# Patient Record
Sex: FEMALE | Race: WHITE | NOT HISPANIC OR LATINO | Employment: FULL TIME | URBAN - METROPOLITAN AREA
[De-identification: names, ages, dates, MRNs, and addresses within clinical notes are randomized per-mention and may not be internally consistent; named-entity substitution may affect disease eponyms.]

---

## 2023-07-20 ENCOUNTER — LAB (OUTPATIENT)
Dept: LAB | Facility: HOSPITAL | Age: 28
End: 2023-07-20
Payer: COMMERCIAL

## 2023-07-20 PROBLEM — O46.8X1 SUBCHORIONIC HEMORRHAGE OF PLACENTA IN FIRST TRIMESTER, SINGLE OR UNSPECIFIED FETUS: Status: ACTIVE | Noted: 2023-07-20

## 2023-07-20 PROBLEM — O41.8X10 SUBCHORIONIC HEMORRHAGE OF PLACENTA IN FIRST TRIMESTER, SINGLE OR UNSPECIFIED FETUS: Status: ACTIVE | Noted: 2023-07-20

## 2023-07-20 PROBLEM — IMO0001 SUBCHORIONIC HEMORRHAGE OF PLACENTA IN FIRST TRIMESTER, SINGLE OR UNSPECIFIED FETUS: Status: ACTIVE | Noted: 2023-07-20

## 2023-07-20 LAB — HEMOGLOBIN FRACTIONATION: NORMAL

## 2023-07-20 PROCEDURE — 83020 HEMOGLOBIN ELECTROPHORESIS: CPT | Performed by: OBSTETRICS & GYNECOLOGY

## 2023-07-20 PROCEDURE — 84443 ASSAY THYROID STIM HORMONE: CPT | Performed by: OBSTETRICS & GYNECOLOGY

## 2023-08-04 ENCOUNTER — LAB (OUTPATIENT)
Dept: LAB | Facility: HOSPITAL | Age: 28
End: 2023-08-04
Payer: COMMERCIAL

## 2023-08-04 ENCOUNTER — ROUTINE PRENATAL (OUTPATIENT)
Dept: OBSTETRICS AND GYNECOLOGY | Age: 28
End: 2023-08-04
Payer: COMMERCIAL

## 2023-08-04 VITALS — BODY MASS INDEX: 21.82 KG/M2 | SYSTOLIC BLOOD PRESSURE: 104 MMHG | WEIGHT: 123.2 LBS | DIASTOLIC BLOOD PRESSURE: 60 MMHG

## 2023-08-04 DIAGNOSIS — O41.8X10 SUBCHORIONIC HEMORRHAGE OF PLACENTA IN FIRST TRIMESTER, SINGLE OR UNSPECIFIED FETUS: Primary | ICD-10-CM

## 2023-08-04 DIAGNOSIS — O46.8X1 SUBCHORIONIC HEMORRHAGE OF PLACENTA IN FIRST TRIMESTER, SINGLE OR UNSPECIFIED FETUS: Primary | ICD-10-CM

## 2023-08-04 DIAGNOSIS — Z3A.10 10 WEEKS GESTATION OF PREGNANCY: ICD-10-CM

## 2023-08-04 LAB
EXTERNAL CYSTIC FIBROSIS: NEGATIVE
EXTERNAL NIPT: NORMAL
GLUCOSE UR STRIP-MCNC: NEGATIVE MG/DL
PROT UR STRIP-MCNC: NEGATIVE MG/DL
VZV IGG SER QL: NORMAL

## 2023-08-04 RX ORDER — CHOLECALCIFEROL (VITAMIN D3) 25 MCG
1 TABLET,CHEWABLE ORAL DAILY
Qty: 60 CAPSULE | Refills: 11 | Status: SHIPPED | OUTPATIENT
Start: 2023-08-04

## 2023-08-11 ENCOUNTER — ROUTINE PRENATAL (OUTPATIENT)
Dept: OBSTETRICS AND GYNECOLOGY | Age: 28
End: 2023-08-11
Payer: COMMERCIAL

## 2023-08-11 VITALS — BODY MASS INDEX: 21.97 KG/M2 | WEIGHT: 124 LBS | SYSTOLIC BLOOD PRESSURE: 102 MMHG | DIASTOLIC BLOOD PRESSURE: 64 MMHG

## 2023-08-11 DIAGNOSIS — O41.8X10 SUBCHORIONIC HEMORRHAGE OF PLACENTA IN FIRST TRIMESTER, SINGLE OR UNSPECIFIED FETUS: ICD-10-CM

## 2023-08-11 DIAGNOSIS — O46.8X1 SUBCHORIONIC HEMORRHAGE OF PLACENTA IN FIRST TRIMESTER, SINGLE OR UNSPECIFIED FETUS: ICD-10-CM

## 2023-08-11 DIAGNOSIS — R39.15 URINARY URGENCY: ICD-10-CM

## 2023-08-11 DIAGNOSIS — Z3A.11 11 WEEKS GESTATION OF PREGNANCY: Primary | ICD-10-CM

## 2023-08-11 LAB
BILIRUB BLD-MCNC: ABNORMAL MG/DL
CLARITY, POC: CLEAR
COLOR UR: YELLOW
GLUCOSE UR STRIP-MCNC: NEGATIVE MG/DL
KETONES UR QL: ABNORMAL
LEUKOCYTE EST, POC: ABNORMAL
NITRITE UR-MCNC: NEGATIVE MG/ML
PH UR: 5.5 [PH] (ref 5–8)
PROT UR STRIP-MCNC: ABNORMAL MG/DL
RBC # UR STRIP: NEGATIVE /UL
SP GR UR: 1.03 (ref 1–1.03)
UROBILINOGEN UR QL: ABNORMAL

## 2023-08-11 RX ORDER — NITROFURANTOIN 25; 75 MG/1; MG/1
100 CAPSULE ORAL 2 TIMES DAILY
Qty: 14 CAPSULE | Refills: 0 | Status: SHIPPED | OUTPATIENT
Start: 2023-08-11 | End: 2023-08-18

## 2023-08-11 NOTE — PROGRESS NOTES
Chief Complaint   Patient presents with    Routine Prenatal Visit     Urgency a few times, unable to void       HPI: 27 y.o.  at 11w5d     Doing well  Add on OB for urinary urgency and difficulty with emptying bladder x 1   Denies flank pain, fever or chills  She denies dysuria  Denies LOF or bleeding  Pt of Dr. William Stanford:    23 1312   BP: 102/64   Weight: 56.2 kg (124 lb)       ROS:  GI:  Negative  : Urgency   Pulmonary: Negative     A/P  1. Intrauterine pregnancy at 11w5d   2. Pregnancy Risk:  NORMAL    Diagnoses and all orders for this visit:    1. 11 weeks gestation of pregnancy (Primary)  -     POC Urinalysis Dipstick    2. Subchorionic hemorrhage of placenta in first trimester, single or unspecified fetus    3. Urinary urgency  -     Urine Culture - Urine, Urine, Clean Catch    Other orders  -     nitrofurantoin, macrocrystal-monohydrate, (MACROBID) 100 MG capsule; Take 1 capsule by mouth 2 (Two) Times a Day for 7 days.  Dispense: 14 capsule; Refill: 0        -----------------------  PLAN:   Urine culture sent  Will treat for UTI  ER warnings discussed  Keep scheduled f/u       DAVY Diop  2023 13:29 EDT

## 2023-08-13 LAB
BACTERIA UR CULT: NO GROWTH
BACTERIA UR CULT: NORMAL

## 2023-08-14 ENCOUNTER — TELEPHONE (OUTPATIENT)
Facility: HOSPITAL | Age: 28
End: 2023-08-14
Payer: COMMERCIAL

## 2023-08-16 LAB
Lab: NEGATIVE
Lab: NORMAL
NTRA ALPHA-THALASSEMIA: NEGATIVE
NTRA BETA-HEMOGLOBINOPATHIES: NEGATIVE
NTRA CANAVAN DISEASE: NEGATIVE
NTRA CYSTIC FIBROSIS: NEGATIVE
NTRA DUCHENNE/BECKER MUSCULAR DYSTROPHY: NEGATIVE
NTRA FAMILIAL DYSAUTONOMIA: NEGATIVE
NTRA FRAGILE X SYNDROME: NEGATIVE
NTRA GALACTOSEMIA: NEGATIVE
NTRA GAUCHER DISEASE: NEGATIVE
NTRA MEDIUM CHAIN ACYL-COA DEHYDROGENASE DEFICIENCY: NEGATIVE
NTRA POLYCYSTIC KIDNEY DISEASE, AUTOSOMAL RECESSIVE: NEGATIVE
NTRA SMITH-LEMLI-OPITZ SYNDROME: NEGATIVE
NTRA SPINAL MUSCULAR ATROPHY: NEGATIVE
NTRA TAY-SACHS DISEASE: NEGATIVE

## 2023-09-05 ENCOUNTER — ROUTINE PRENATAL (OUTPATIENT)
Dept: OBSTETRICS AND GYNECOLOGY | Age: 28
End: 2023-09-05
Payer: COMMERCIAL

## 2023-09-05 VITALS — SYSTOLIC BLOOD PRESSURE: 104 MMHG | WEIGHT: 127 LBS | DIASTOLIC BLOOD PRESSURE: 66 MMHG | BODY MASS INDEX: 22.5 KG/M2

## 2023-09-05 DIAGNOSIS — Z3A.15 15 WEEKS GESTATION OF PREGNANCY: Primary | ICD-10-CM

## 2023-09-05 LAB
2ND TRIMESTER 4 SCREEN SERPL-IMP: NORMAL
AFP INTERP SERPL-IMP: NORMAL
GLUCOSE UR STRIP-MCNC: NEGATIVE MG/DL
PROT UR STRIP-MCNC: NEGATIVE MG/DL

## 2023-09-05 NOTE — PROGRESS NOTES
Pt presents for routine visit. She denies any vag bleeding or significant pain. She has occ ligament pain and some GERD    A/p: 15 weeks: pt declines afp screening, u/s at f/u. Reviewed management of GERD and ligament pain. F/u 4 weeks with anatomy u/s.

## 2023-10-09 ENCOUNTER — ROUTINE PRENATAL (OUTPATIENT)
Dept: OBSTETRICS AND GYNECOLOGY | Age: 28
End: 2023-10-09
Payer: COMMERCIAL

## 2023-10-09 VITALS — BODY MASS INDEX: 23.24 KG/M2 | WEIGHT: 131.2 LBS | SYSTOLIC BLOOD PRESSURE: 100 MMHG | DIASTOLIC BLOOD PRESSURE: 62 MMHG

## 2023-10-09 DIAGNOSIS — Z3A.20 20 WEEKS GESTATION OF PREGNANCY: Primary | ICD-10-CM

## 2023-10-09 LAB
GLUCOSE UR STRIP-MCNC: NEGATIVE MG/DL
PROT UR STRIP-MCNC: NEGATIVE MG/DL

## 2023-10-09 RX ORDER — CETIRIZINE HYDROCHLORIDE 10 MG/1
TABLET, CHEWABLE ORAL EVERY 24 HOURS
COMMUNITY
Start: 2023-09-23 | End: 2023-10-23

## 2023-10-09 NOTE — PROGRESS NOTES
Pt presents for routine visit and u/s for anatomy    U/s: normal anatomic survey; cervical length 5.0 cm    A/p: 20 weeks: reviewed complete anatomic survey and reassuring cervical length. Pt plans to get flu shot at work    Hx ANIA: growth u/s, at 28 weeks

## 2023-11-07 ENCOUNTER — ROUTINE PRENATAL (OUTPATIENT)
Dept: OBSTETRICS AND GYNECOLOGY | Age: 28
End: 2023-11-07
Payer: COMMERCIAL

## 2023-11-07 VITALS — DIASTOLIC BLOOD PRESSURE: 60 MMHG | SYSTOLIC BLOOD PRESSURE: 118 MMHG | WEIGHT: 135.8 LBS | BODY MASS INDEX: 24.06 KG/M2

## 2023-11-07 DIAGNOSIS — Z3A.24 24 WEEKS GESTATION OF PREGNANCY: Primary | ICD-10-CM

## 2023-11-07 LAB
GLUCOSE UR STRIP-MCNC: NEGATIVE MG/DL
PROT UR STRIP-MCNC: NEGATIVE MG/DL

## 2023-11-07 NOTE — PROGRESS NOTES
Pt presents for routine visit. She denies vag bleeding/leaking fluid or reg ctx. She does note a tight feeling in her vagina.     A/p: vaginal tightness/possible pressure: reviewed PTL warnings. Will check cervical length u/s today    24 weeks: pt did not get flu shot at work and declines. F/u 4 weeks with one hour glucose    Hx ANIA: growth u/s at f/u.

## 2023-11-22 ENCOUNTER — HOSPITAL ENCOUNTER (EMERGENCY)
Facility: HOSPITAL | Age: 28
Discharge: HOME | End: 2023-11-22
Attending: OBSTETRICS & GYNECOLOGY | Admitting: OBSTETRICS & GYNECOLOGY
Payer: COMMERCIAL

## 2023-11-22 VITALS
RESPIRATION RATE: 16 BRPM | BODY MASS INDEX: 24.77 KG/M2 | SYSTOLIC BLOOD PRESSURE: 110 MMHG | WEIGHT: 139.8 LBS | TEMPERATURE: 98.7 F | HEIGHT: 63 IN | DIASTOLIC BLOOD PRESSURE: 70 MMHG | HEART RATE: 86 BPM

## 2023-11-22 LAB
BACTERIA UR QL AUTO: ABNORMAL /HPF
BILIRUB UR QL STRIP: NEGATIVE
CLARITY UR: CLEAR
COD CRY URNS QL: ABNORMAL /HPF
COLOR UR: YELLOW
FIBRONECTIN FETAL VAG QL: NEGATIVE
GLUCOSE UR STRIP-MCNC: NEGATIVE MG/DL
HGB UR QL STRIP.AUTO: NEGATIVE
HYALINE CASTS UR QL AUTO: ABNORMAL /LPF
KETONES UR QL STRIP: ABNORMAL
LEUKOCYTE ESTERASE UR QL STRIP.AUTO: ABNORMAL
NITRITE UR QL STRIP: NEGATIVE
PH UR STRIP.AUTO: 8 [PH] (ref 5–8)
PROT UR QL STRIP: NEGATIVE
RBC # UR STRIP: ABNORMAL /HPF
REF LAB TEST METHOD: ABNORMAL
SP GR UR STRIP: <1.005 (ref 1–1.03)
SQUAMOUS #/AREA URNS HPF: ABNORMAL /HPF
UROBILINOGEN UR QL STRIP: ABNORMAL
WBC # UR STRIP: ABNORMAL /HPF

## 2023-11-22 PROCEDURE — 81001 URINALYSIS AUTO W/SCOPE: CPT | Performed by: OBSTETRICS & GYNECOLOGY

## 2023-11-22 PROCEDURE — 82731 ASSAY OF FETAL FIBRONECTIN: CPT | Performed by: OBSTETRICS & GYNECOLOGY

## 2023-11-22 PROCEDURE — 87086 URINE CULTURE/COLONY COUNT: CPT | Performed by: OBSTETRICS & GYNECOLOGY

## 2023-11-22 PROCEDURE — 99284 EMERGENCY DEPT VISIT MOD MDM: CPT | Performed by: OBSTETRICS & GYNECOLOGY

## 2023-11-22 RX ORDER — MINERAL OIL
OIL (ML) MISCELLANEOUS AS NEEDED
Status: DISCONTINUED | OUTPATIENT
Start: 2023-11-22 | End: 2023-11-22 | Stop reason: HOSPADM

## 2023-11-23 NOTE — OBED NOTES
[facility]   Amol  : 1995  MRN: [id number]  CSN: [id number]    OB ED Provider Note    Subjective   Chief Complaint   Patient presents with    Abdominal Pain     [facility]- pt reports RLQ pain that comes and goes, starting at 1700 anywhere from several minutes to one hour apart. Pt rates pain 4/10 and denies LOF or VB      Amol is a 28 y.o. year old  with an Estimated Date of Delivery: 24 currently at 26w3d presenting with intermittent, fleeting RLQ pain since 1700 that has resolved since arrival to the [facility]. The pain is described as sharp. She denies ROM or VB. FM is present.    Prenatal care has been with Dr. Thomas.  It has been benign.    OB History    Para Term  AB Living   1 0 0 0 0 0   SAB IAB Ectopic Molar Multiple Live Births   0 0 0 0 0 0      # Outcome Date GA Lbr Nir/2nd Weight Sex Delivery Anes PTL Lv   1 Current              Past Medical History:   Diagnosis Date    Anxiety      Past Surgical History:   Procedure Laterality Date    TONSILLECTOMY       No current facility-administered medications for this encounter.    No Known Allergies  Social History    Tobacco Use      Smoking status: Never      Smokeless tobacco: Not on file    Review of Systems   Gastrointestinal:  Positive for abdominal pain.   All other systems reviewed and are negative.        Objective   LMP 2023 (Exact Date)   General: well developed; well nourished  no acute distress   Abdomen: soft, non-tender; no masses  gravid    FHT's: reactive and category 1      Cervix: was checked (by RN): 0 cm / 0 % / -2   Presentation: breech   Contractions: irregular   Chest: Unlabored respirations    CV:  RRR   Ext:   No C/C/E   Back: CVA tenderness is deferred bilateral        Prenatal Labs  Lab Results   Component Value Date    HGB 13.3 2023    RUBELLAABIGG 11.60 2023    HEPBSAG Non-Reactive 2023    ABSCRN Negative 2023    PKD6OJG4 Non-Reactive 2023     HEPCVIRUSABY Non-Reactive 07/20/2023    URINECX Final report 08/11/2023    CHLAMNAA Negative 07/20/2023    NGONORRHON Negative 07/20/2023       Current Labs Reviewed   UA:    Lab Results   Component Value Date    SQUAMEPIUA None Seen 11/22/2023    SPECGRAVUR <1.005 (L) 11/22/2023    KETONESU Trace (A) 11/22/2023    BLOODU Negative 11/22/2023    LEUKOCYTESUR Moderate (2+) (A) 11/22/2023    NITRITEU Negative 11/22/2023    RBCUA None Seen 11/22/2023    WBCUA 0-2 11/22/2023    BACTERIA 1+ (A) 11/22/2023     fFN negative       Assessment   IUP at 26w3d  Lower abdominal pain- negative fFN, no cervical dilation, only irregular CTX. Location, fleeting nature suggests MSK source     Plan   D/C home with instructions to return for worsening symptoms, acute changes. Keep regularly scheduled prenatal appointments.      Nishant Frey MD  11/22/2023  19:35 EST

## 2023-11-24 LAB — BACTERIA SPEC AEROBE CULT: NO GROWTH

## 2023-12-05 ENCOUNTER — ROUTINE PRENATAL (OUTPATIENT)
Dept: OBSTETRICS AND GYNECOLOGY | Age: 28
End: 2023-12-05
Payer: COMMERCIAL

## 2023-12-05 VITALS — WEIGHT: 141.2 LBS | SYSTOLIC BLOOD PRESSURE: 112 MMHG | BODY MASS INDEX: 25.01 KG/M2 | DIASTOLIC BLOOD PRESSURE: 60 MMHG

## 2023-12-05 DIAGNOSIS — Z3A.28 28 WEEKS GESTATION OF PREGNANCY: Primary | ICD-10-CM

## 2023-12-05 DIAGNOSIS — Z13.1 SCREENING FOR DIABETES MELLITUS: ICD-10-CM

## 2023-12-05 LAB
GLUCOSE UR STRIP-MCNC: NEGATIVE MG/DL
PROT UR STRIP-MCNC: NEGATIVE MG/DL

## 2023-12-05 NOTE — PROGRESS NOTES
Pt presents for routine visit. She denies vag bleeding or leaking fluid. She has sporadic ctx but denies regular ctx. She is feeling fetal movement. She went to L&D for discomfort, she was evaluated and the MD felt it was musculoskeletal discomfort.     A/p: 28 weeks: recommended tdap and pt declines today. Counseling provided. Reviewed PTL warnings. Advised daily fmc's. One hour glucose today. Discussed RSV vaccination recommended 32 to 36 weeks.     GERD: reviewed management options.     ANIA in first trimester: growth u/s booked later today    Addendum: growth normal at 64 %

## 2023-12-06 DIAGNOSIS — R73.09 ELEVATED GLUCOSE TOLERANCE TEST: Primary | ICD-10-CM

## 2023-12-06 LAB
ERYTHROCYTE [DISTWIDTH] IN BLOOD BY AUTOMATED COUNT: 12.7 % (ref 11.7–15.4)
GLUCOSE 1H P 50 G GLC PO SERPL-MCNC: 171 MG/DL (ref 70–139)
HCT VFR BLD AUTO: 36.4 % (ref 34–46.6)
HGB BLD-MCNC: 12.2 G/DL (ref 11.1–15.9)
MCH RBC QN AUTO: 31.6 PG (ref 26.6–33)
MCHC RBC AUTO-ENTMCNC: 33.5 G/DL (ref 31.5–35.7)
MCV RBC AUTO: 94 FL (ref 79–97)
PLATELET # BLD AUTO: 263 X10E3/UL (ref 150–450)
RBC # BLD AUTO: 3.86 X10E6/UL (ref 3.77–5.28)
RPR SER QL: NON REACTIVE
WBC # BLD AUTO: 14.4 X10E3/UL (ref 3.4–10.8)

## 2023-12-08 ENCOUNTER — LAB (OUTPATIENT)
Dept: OBSTETRICS AND GYNECOLOGY | Age: 28
End: 2023-12-08
Payer: COMMERCIAL

## 2023-12-08 DIAGNOSIS — R73.09 ELEVATED GLUCOSE TOLERANCE TEST: Primary | ICD-10-CM

## 2023-12-08 LAB
GLUCOSE 1H P 100 G GLC PO SERPL-MCNC: 180 MG/DL (ref 65–179)
GLUCOSE 2H P 100 G GLC PO SERPL-MCNC: 196 MG/DL (ref 65–154)
GLUCOSE 3H P 100 G GLC PO SERPL-MCNC: 160 MG/DL (ref 65–139)
GLUCOSE P FAST SERPL-MCNC: 72 MG/DL (ref 65–94)

## 2023-12-11 ENCOUNTER — TELEPHONE (OUTPATIENT)
Dept: OBSTETRICS AND GYNECOLOGY | Age: 28
End: 2023-12-11
Payer: COMMERCIAL

## 2023-12-11 DIAGNOSIS — O24.410 DIET CONTROLLED GESTATIONAL DIABETES MELLITUS (GDM), ANTEPARTUM: Primary | ICD-10-CM

## 2023-12-11 RX ORDER — BLOOD-GLUCOSE METER
1 KIT MISCELLANEOUS AS NEEDED
Qty: 1 EACH | Refills: 0 | Status: SHIPPED | OUTPATIENT
Start: 2023-12-11

## 2023-12-11 RX ORDER — BLOOD-GLUCOSE METER
1 KIT MISCELLANEOUS AS NEEDED
Qty: 140 EACH | Refills: 4 | Status: SHIPPED | OUTPATIENT
Start: 2023-12-11 | End: 2023-12-11

## 2023-12-11 NOTE — TELEPHONE ENCOUNTER
Called April and reviewed glucose results. Advised they are consistent with GDM and she will need to see diabetes educator and start glucose monitoring. She notes understanding. Advised her to bring log with glucose values to each appt. Advised her to call if she does not receive contact regarding diabetes education within 2 days.

## 2023-12-15 ENCOUNTER — HOSPITAL ENCOUNTER (OUTPATIENT)
Dept: DIABETES SERVICES | Facility: HOSPITAL | Age: 28
Discharge: HOME | End: 2023-12-15
Payer: COMMERCIAL

## 2023-12-15 PROCEDURE — G0108 DIAB MANAGE TRN  PER INDIV: HCPCS

## 2023-12-15 NOTE — NURSING NOTE
[facility]   Diabetes Management       Date:  12/15/2023    Patient:   Amol     MRN:  [id number]    Gestational age:  29w5d       Diagnosis:  Gestational Diabetes A1      Management Role:  Phase I:  Counseling and Education      Insulin dosing:   N/A    Summary   The patient was seen for gestational diabetes education this morning at [facility].   Meal planning for three small meals and snacks discussed. Give pt One Touch Ultra BG meter instructions. Pt verbalizes understanding. The plan at this time is follow up with Dr Thomas next week. Pt has a copy of BG log.   Thank you for this referral. Please don't hesitate to contact me with questions.     Referring provider:  Svetlana Thomas Md  [address] [address]      Tori Dumas RN, BSN, [facility]  12/15/2023  11:28 EST

## 2023-12-19 ENCOUNTER — ROUTINE PRENATAL (OUTPATIENT)
Dept: OBSTETRICS AND GYNECOLOGY | Age: 28
End: 2023-12-19
Payer: COMMERCIAL

## 2023-12-19 VITALS — BODY MASS INDEX: 24.94 KG/M2 | SYSTOLIC BLOOD PRESSURE: 102 MMHG | WEIGHT: 140.8 LBS | DIASTOLIC BLOOD PRESSURE: 64 MMHG

## 2023-12-19 DIAGNOSIS — Z3A.30 30 WEEKS GESTATION OF PREGNANCY: Primary | ICD-10-CM

## 2023-12-19 LAB
GLUCOSE UR STRIP-MCNC: NEGATIVE MG/DL
PROT UR STRIP-MCNC: NEGATIVE MG/DL

## 2023-12-19 RX ORDER — BLOOD-GLUCOSE METER
EACH MISCELLANEOUS
COMMUNITY
Start: 2023-12-11

## 2023-12-19 NOTE — PROGRESS NOTES
Pt presents for routine visit. She denies vag bleeding/leaking fluid or reg ctx      A/p: GDM: glucose log reviewed all values are normal. Fasting are 72 to 82; pp values are 84 to 106. Continue glucose monitoring and diet. Plan weekly BPP 32+ wks and growth surveillance.     30 weeks: pt desires tdap today. She plans on RSV next visit. Reviewed PTL warnings, continue daily fmc's

## 2024-01-02 ENCOUNTER — ROUTINE PRENATAL (OUTPATIENT)
Dept: OBSTETRICS AND GYNECOLOGY | Age: 29
End: 2024-01-02
Payer: COMMERCIAL

## 2024-01-02 VITALS — DIASTOLIC BLOOD PRESSURE: 70 MMHG | SYSTOLIC BLOOD PRESSURE: 100 MMHG | WEIGHT: 141.8 LBS | BODY MASS INDEX: 25.12 KG/M2

## 2024-01-02 DIAGNOSIS — O24.410 DIET CONTROLLED GESTATIONAL DIABETES MELLITUS (GDM), ANTEPARTUM: Primary | ICD-10-CM

## 2024-01-02 DIAGNOSIS — Z3A.32 32 WEEKS GESTATION OF PREGNANCY: ICD-10-CM

## 2024-01-02 DIAGNOSIS — N89.8 VAGINAL DISCHARGE: ICD-10-CM

## 2024-01-02 LAB
BILIRUB BLD-MCNC: NEGATIVE MG/DL
GLUCOSE UR STRIP-MCNC: NEGATIVE MG/DL
KETONES UR QL: NEGATIVE
LEUKOCYTE EST, POC: ABNORMAL
NITRITE UR-MCNC: NEGATIVE MG/ML
PH UR: 7 [PH] (ref 5–8)
PROT UR STRIP-MCNC: NEGATIVE MG/DL
RBC # UR STRIP: NEGATIVE /UL
SP GR UR: 1.01 (ref 1–1.03)
UROBILINOGEN UR QL: ABNORMAL

## 2024-01-02 PROCEDURE — 0502F SUBSEQUENT PRENATAL CARE: CPT | Performed by: OBSTETRICS & GYNECOLOGY

## 2024-01-02 NOTE — PROGRESS NOTES
Pt presents for routine visit. She complains of vaginal discharge that is yellow. She denies leaking fluid or bleeding. She denies odor or irritation.     O: spec exam: small white discharge noted. Cervix appears closed. Swab obtained.    U/s: growth 51 %, breech pres, mary 10.9, BPP 8/8    A/p: GDM: glucose values reviewed. All fasting values are normal, less that 83. Rare pp elevation only. Will continue diet control. Continue daily fmc's and weekly BPP.     Vag discharge: swab obtained.     32 weeks: continue PTL warnings and daily fmc's. Recommended RSV vaccine. Pt wants to wait until next week.

## 2024-01-04 LAB
A VAGINAE DNA VAG QL NAA+PROBE: NORMAL SCORE
BVAB2 DNA VAG QL NAA+PROBE: NORMAL SCORE
C ALBICANS DNA VAG QL NAA+PROBE: NEGATIVE
C GLABRATA DNA VAG QL NAA+PROBE: NEGATIVE
C TRACH DNA VAG QL NAA+PROBE: NEGATIVE
MEGA1 DNA VAG QL NAA+PROBE: NORMAL SCORE
N GONORRHOEA DNA VAG QL NAA+PROBE: NEGATIVE
T VAGINALIS DNA VAG QL NAA+PROBE: NEGATIVE

## 2024-01-09 ENCOUNTER — ROUTINE PRENATAL (OUTPATIENT)
Dept: OBSTETRICS AND GYNECOLOGY | Age: 29
End: 2024-01-09
Payer: COMMERCIAL

## 2024-01-09 VITALS — BODY MASS INDEX: 25.51 KG/M2 | DIASTOLIC BLOOD PRESSURE: 68 MMHG | SYSTOLIC BLOOD PRESSURE: 102 MMHG | WEIGHT: 144 LBS

## 2024-01-09 DIAGNOSIS — Z3A.33 33 WEEKS GESTATION OF PREGNANCY: Primary | ICD-10-CM

## 2024-01-09 DIAGNOSIS — O24.410 DIET CONTROLLED GESTATIONAL DIABETES MELLITUS (GDM), ANTEPARTUM: ICD-10-CM

## 2024-01-09 LAB
GLUCOSE UR STRIP-MCNC: NEGATIVE MG/DL
PROT UR STRIP-MCNC: NEGATIVE MG/DL

## 2024-01-09 NOTE — PROGRESS NOTES
Chief Complaint   Patient presents with    Routine Prenatal Visit     33 week ob visit       HPI: 28 y.o.  at 33w2d gestation  She is here for routine ob visit  She is GDM  Has her BS log with her today  Numbers are all wnl  She has good FM  Will receive RSV today  Has f/u scheduled next week already  C/w [facility]KC and call for any LOF or DFM    Vitals:    24 0916   BP: 102/68   Weight: 65.3 kg (144 lb)       ROS:  GI:  Negative  : na  Pulmonary: Negative     A/P  1. Intrauterine pregnancy at 33w2d   2. Pregnancy Risk:  HIGH RISK    Diagnoses and all orders for this visit:    1. 33 weeks gestation of pregnancy (Primary)  -     POC Urinalysis Dipstick, Multipro    2. Diet controlled gestational diabetes mellitus (GDM), antepartum        -----------------------  PLAN:   Return in about 1 week (around 2024) for as scheduled.      PAOLO Funez  2024 09:29 EST

## 2024-01-16 ENCOUNTER — ROUTINE PRENATAL (OUTPATIENT)
Dept: OBSTETRICS AND GYNECOLOGY | Age: 29
End: 2024-01-16
Payer: COMMERCIAL

## 2024-01-16 VITALS — BODY MASS INDEX: 25.44 KG/M2 | WEIGHT: 143.6 LBS | SYSTOLIC BLOOD PRESSURE: 108 MMHG | DIASTOLIC BLOOD PRESSURE: 74 MMHG

## 2024-01-16 DIAGNOSIS — Z3A.34 34 WEEKS GESTATION OF PREGNANCY: Primary | ICD-10-CM

## 2024-01-16 LAB
GLUCOSE UR STRIP-MCNC: NEGATIVE MG/DL
PROT UR STRIP-MCNC: NEGATIVE MG/DL

## 2024-01-16 NOTE — PROGRESS NOTES
Pt presents for routine visit. She is feeling fetal movement. She denies reg ctx, vag bleeding/leaking fluid.     U/s: BPP 8/8, mary 11.1, breech    A/p: GDM: good control overall with diet. Occ elevations due to diet, continue glucose monitoring, daily fmc's and weekly BPP    Breech: discussed with pt. They desire primary c/s if she remains breech. Discussed risks/benefits.     34 weeks: reviewed PTL warnings.

## 2024-01-23 ENCOUNTER — TELEPHONE (OUTPATIENT)
Dept: OBSTETRICS AND GYNECOLOGY | Age: 29
End: 2024-01-23

## 2024-01-23 ENCOUNTER — HOSPITAL ENCOUNTER (OUTPATIENT)
Facility: HOSPITAL | Age: 29
Setting detail: SURGERY ADMIT
End: 2024-01-23
Attending: OBSTETRICS & GYNECOLOGY | Admitting: OBSTETRICS & GYNECOLOGY
Payer: COMMERCIAL

## 2024-01-23 ENCOUNTER — ROUTINE PRENATAL (OUTPATIENT)
Dept: OBSTETRICS AND GYNECOLOGY | Age: 29
End: 2024-01-23
Payer: COMMERCIAL

## 2024-01-23 ENCOUNTER — PREP FOR SURGERY (OUTPATIENT)
Dept: OTHER | Facility: HOSPITAL | Age: 29
End: 2024-01-23
Payer: COMMERCIAL

## 2024-01-23 VITALS — DIASTOLIC BLOOD PRESSURE: 70 MMHG | BODY MASS INDEX: 25.83 KG/M2 | WEIGHT: 145.8 LBS | SYSTOLIC BLOOD PRESSURE: 116 MMHG

## 2024-01-23 DIAGNOSIS — Z3A.35 35 WEEKS GESTATION OF PREGNANCY: Primary | ICD-10-CM

## 2024-01-23 DIAGNOSIS — Z36.85 ANTENATAL SCREENING FOR STREPTOCOCCUS B: ICD-10-CM

## 2024-01-23 DIAGNOSIS — O24.410 DIET CONTROLLED GESTATIONAL DIABETES MELLITUS (GDM), ANTEPARTUM: Primary | ICD-10-CM

## 2024-01-23 LAB
GLUCOSE UR STRIP-MCNC: NEGATIVE MG/DL
PROT UR STRIP-MCNC: NEGATIVE MG/DL

## 2024-01-23 PROCEDURE — 0502F SUBSEQUENT PRENATAL CARE: CPT | Performed by: OBSTETRICS & GYNECOLOGY

## 2024-01-23 NOTE — TELEPHONE ENCOUNTER
Caller: Amol April D    Relationship to patient: Self    Best call back number: [phone]    Patient is needing: OB PATIENT OF THE OFFICE. PT RETURNING MISSED CALL FROM OFFICE. NO MESSAGE LEFT IN CHART. FOR RETURN CALL

## 2024-01-23 NOTE — PROGRESS NOTES
Pt presents for routine appt/u/s. She is feeling fetal movements.     U/s: breech, BPP 8/8, mary 18, growth 48 % ac 33 %    A/p: GDM: glucose log reviewed and values predominantly normal. Fasting are all 93 or less. PP values with sporadic mild elevations that are diet related. Continue daily fmc's and weekly BPP.    Breech: discussed with pt and she desires primary c/s if position remains breech. She declines ECV. She desires to schedule c/s at this time for around 39 weeks. Reviewed options.     35 weeks: GBS done, reviewed labor warnings

## 2024-01-25 ENCOUNTER — ANESTHESIA EVENT (OUTPATIENT)
Dept: LABOR AND DELIVERY | Facility: HOSPITAL | Age: 29
End: 2024-01-25
Payer: COMMERCIAL

## 2024-01-25 ENCOUNTER — HOSPITAL ENCOUNTER (INPATIENT)
Facility: HOSPITAL | Age: 29
LOS: 3 days | Discharge: HOME | End: 2024-01-28
Attending: OBSTETRICS & GYNECOLOGY | Admitting: OBSTETRICS & GYNECOLOGY
Payer: COMMERCIAL

## 2024-01-25 ENCOUNTER — ANESTHESIA (OUTPATIENT)
Dept: LABOR AND DELIVERY | Facility: HOSPITAL | Age: 29
End: 2024-01-25
Payer: COMMERCIAL

## 2024-01-25 DIAGNOSIS — O42.00 PREMATURE RUPTURE OF MEMBRANES WITH ONSET OF LABOR WITHIN 24 HOURS OF RUPTURE, UNSPECIFIED GESTATIONAL AGE: ICD-10-CM

## 2024-01-25 DIAGNOSIS — O24.410 DIET CONTROLLED GESTATIONAL DIABETES MELLITUS (GDM), ANTEPARTUM: ICD-10-CM

## 2024-01-25 PROBLEM — Z34.90 PREGNANT: Status: ACTIVE | Noted: 2024-01-25

## 2024-01-25 LAB
A1 MICROGLOB PLACENTAL VAG QL: POSITIVE
ABO GROUP BLD: NORMAL
ALBUMIN SERPL-MCNC: 3.6 G/DL (ref 3.5–5.2)
ALBUMIN/GLOB SERPL: 1.7 G/DL
ALP SERPL-CCNC: 147 U/L (ref 39–117)
ALT SERPL W P-5'-P-CCNC: 14 U/L (ref 1–33)
ANION GAP SERPL CALCULATED.3IONS-SCNC: 14 MMOL/L (ref 5–15)
AST SERPL-CCNC: 16 U/L (ref 1–32)
BILIRUB SERPL-MCNC: 0.3 MG/DL (ref 0–1.2)
BILIRUB UR QL STRIP: NEGATIVE
BLD GP AB SCN SERPL QL: NEGATIVE
BUN SERPL-MCNC: 10 MG/DL (ref 6–20)
BUN/CREAT SERPL: 16.1 (ref 7–25)
CALCIUM SPEC-SCNC: 8.4 MG/DL (ref 8.6–10.5)
CHLORIDE SERPL-SCNC: 108 MMOL/L (ref 98–107)
CLARITY UR: CLEAR
CO2 SERPL-SCNC: 18 MMOL/L (ref 22–29)
COLOR UR: YELLOW
CREAT SERPL-MCNC: 0.62 MG/DL (ref 0.57–1)
DEPRECATED RDW RBC AUTO: 44.4 FL (ref 37–54)
EGFRCR SERPLBLD CKD-EPI 2021: 126.1 ML/MIN/1.73
ERYTHROCYTE [DISTWIDTH] IN BLOOD BY AUTOMATED COUNT: 13 % (ref 12.3–15.4)
GLOBULIN UR ELPH-MCNC: 2.1 GM/DL
GLUCOSE SERPL-MCNC: 79 MG/DL (ref 65–99)
GLUCOSE UR STRIP-MCNC: NEGATIVE MG/DL
HCT VFR BLD AUTO: 37.8 % (ref 34–46.6)
HGB BLD-MCNC: 12.8 G/DL (ref 12–15.9)
HGB UR QL STRIP.AUTO: NEGATIVE
KETONES UR QL STRIP: ABNORMAL
LEUKOCYTE ESTERASE UR QL STRIP.AUTO: NEGATIVE
MCH RBC QN AUTO: 31.8 PG (ref 26.6–33)
MCHC RBC AUTO-ENTMCNC: 33.9 G/DL (ref 31.5–35.7)
MCV RBC AUTO: 94 FL (ref 79–97)
NITRITE UR QL STRIP: NEGATIVE
PH UR STRIP.AUTO: 6 [PH] (ref 5–8)
PLATELET # BLD AUTO: 240 10*3/MM3 (ref 140–450)
PMV BLD AUTO: 9.9 FL (ref 6–12)
POTASSIUM SERPL-SCNC: 3.5 MMOL/L (ref 3.5–5.2)
PROT SERPL-MCNC: 5.7 G/DL (ref 6–8.5)
PROT UR QL STRIP: NEGATIVE
RBC # BLD AUTO: 4.02 10*6/MM3 (ref 3.77–5.28)
RH BLD: POSITIVE
SODIUM SERPL-SCNC: 140 MMOL/L (ref 136–145)
SP GR UR STRIP: 1.02 (ref 1–1.03)
T PALLIDUM IGG SER QL: NORMAL
T&S EXPIRATION DATE: NORMAL
UROBILINOGEN UR QL STRIP: ABNORMAL
WBC NRBC COR # BLD AUTO: 11.09 10*3/MM3 (ref 3.4–10.8)

## 2024-01-25 PROCEDURE — S0260 H&P FOR SURGERY: HCPCS | Performed by: OBSTETRICS & GYNECOLOGY

## 2024-01-25 PROCEDURE — 59510 CESAREAN DELIVERY: CPT | Performed by: OBSTETRICS & GYNECOLOGY

## 2024-01-25 PROCEDURE — 86780 TREPONEMA PALLIDUM: CPT | Performed by: OBSTETRICS & GYNECOLOGY

## 2024-01-25 PROCEDURE — 86900 BLOOD TYPING SEROLOGIC ABO: CPT | Performed by: OBSTETRICS & GYNECOLOGY

## 2024-01-25 PROCEDURE — 84112 EVAL AMNIOTIC FLUID PROTEIN: CPT | Performed by: OBSTETRICS & GYNECOLOGY

## 2024-01-25 PROCEDURE — 25010000002 KETOROLAC TROMETHAMINE PER 15 MG: Performed by: OBSTETRICS & GYNECOLOGY

## 2024-01-25 PROCEDURE — 81003 URINALYSIS AUTO W/O SCOPE: CPT | Performed by: OBSTETRICS & GYNECOLOGY

## 2024-01-25 PROCEDURE — 25010000002 MORPHINE PER 10 MG: Performed by: NURSE ANESTHETIST, CERTIFIED REGISTERED

## 2024-01-25 PROCEDURE — 25010000002 AZITHROMYCIN PER 500 MG: Performed by: OBSTETRICS & GYNECOLOGY

## 2024-01-25 PROCEDURE — 86850 RBC ANTIBODY SCREEN: CPT | Performed by: OBSTETRICS & GYNECOLOGY

## 2024-01-25 PROCEDURE — 99202 OFFICE O/P NEW SF 15 MIN: CPT | Performed by: OBSTETRICS & GYNECOLOGY

## 2024-01-25 PROCEDURE — 25010000002 CEFAZOLIN IN DEXTROSE 2-4 GM/100ML-% SOLUTION: Performed by: OBSTETRICS & GYNECOLOGY

## 2024-01-25 PROCEDURE — 25810000003 LACTATED RINGERS PER 1000 ML: Performed by: OBSTETRICS & GYNECOLOGY

## 2024-01-25 PROCEDURE — 25010000002 ONDANSETRON PER 1 MG: Performed by: STUDENT IN AN ORGANIZED HEALTH CARE EDUCATION/TRAINING PROGRAM

## 2024-01-25 PROCEDURE — 85027 COMPLETE CBC AUTOMATED: CPT | Performed by: OBSTETRICS & GYNECOLOGY

## 2024-01-25 PROCEDURE — 25010000002 BUPIVACAINE PF 0.75 % SOLUTION: Performed by: NURSE ANESTHETIST, CERTIFIED REGISTERED

## 2024-01-25 PROCEDURE — 25810000003 SODIUM CHLORIDE 0.9 % SOLUTION 250 ML FLEX CONT: Performed by: OBSTETRICS & GYNECOLOGY

## 2024-01-25 PROCEDURE — 80053 COMPREHEN METABOLIC PANEL: CPT | Performed by: OBSTETRICS & GYNECOLOGY

## 2024-01-25 PROCEDURE — 87086 URINE CULTURE/COLONY COUNT: CPT | Performed by: OBSTETRICS & GYNECOLOGY

## 2024-01-25 PROCEDURE — 25810000003 LACTATED RINGERS SOLUTION: Performed by: OBSTETRICS & GYNECOLOGY

## 2024-01-25 PROCEDURE — 86901 BLOOD TYPING SEROLOGIC RH(D): CPT | Performed by: OBSTETRICS & GYNECOLOGY

## 2024-01-25 PROCEDURE — 88307 TISSUE EXAM BY PATHOLOGIST: CPT

## 2024-01-25 RX ORDER — OXYCODONE HYDROCHLORIDE 5 MG/1
5 TABLET ORAL EVERY 4 HOURS PRN
Status: DISCONTINUED | OUTPATIENT
Start: 2024-01-25 | End: 2024-01-28 | Stop reason: HOSPADM

## 2024-01-25 RX ORDER — CARBOPROST TROMETHAMINE 250 UG/ML
250 INJECTION, SOLUTION INTRAMUSCULAR
Status: DISCONTINUED | OUTPATIENT
Start: 2024-01-25 | End: 2024-01-25 | Stop reason: HOSPADM

## 2024-01-25 RX ORDER — METOCLOPRAMIDE 10 MG/1
10 TABLET ORAL ONCE
Status: DISCONTINUED | OUTPATIENT
Start: 2024-01-25 | End: 2024-01-28 | Stop reason: HOSPADM

## 2024-01-25 RX ORDER — SODIUM CHLORIDE 0.9 % (FLUSH) 0.9 %
10 SYRINGE (ML) INJECTION AS NEEDED
Status: DISCONTINUED | OUTPATIENT
Start: 2024-01-25 | End: 2024-01-25 | Stop reason: HOSPADM

## 2024-01-25 RX ORDER — KETOROLAC TROMETHAMINE 30 MG/ML
30 INJECTION, SOLUTION INTRAMUSCULAR; INTRAVENOUS ONCE
Status: COMPLETED | OUTPATIENT
Start: 2024-01-25 | End: 2024-01-25

## 2024-01-25 RX ORDER — PROMETHAZINE HYDROCHLORIDE 12.5 MG/1
12.5 SUPPOSITORY RECTAL EVERY 6 HOURS PRN
Status: DISCONTINUED | OUTPATIENT
Start: 2024-01-25 | End: 2024-01-28 | Stop reason: HOSPADM

## 2024-01-25 RX ORDER — MORPHINE SULFATE 2 MG/ML
2 INJECTION, SOLUTION INTRAMUSCULAR; INTRAVENOUS
Status: ACTIVE | OUTPATIENT
Start: 2024-01-25 | End: 2024-01-25

## 2024-01-25 RX ORDER — BUPIVACAINE HYDROCHLORIDE 7.5 MG/ML
INJECTION, SOLUTION EPIDURAL; RETROBULBAR
Status: COMPLETED | OUTPATIENT
Start: 2024-01-25 | End: 2024-01-25

## 2024-01-25 RX ORDER — SIMETHICONE 80 MG
80 TABLET,CHEWABLE ORAL 4 TIMES DAILY PRN
Status: DISCONTINUED | OUTPATIENT
Start: 2024-01-25 | End: 2024-01-28 | Stop reason: HOSPADM

## 2024-01-25 RX ORDER — SODIUM CHLORIDE, SODIUM LACTATE, POTASSIUM CHLORIDE, CALCIUM CHLORIDE 600; 310; 30; 20 MG/100ML; MG/100ML; MG/100ML; MG/100ML
125 INJECTION, SOLUTION INTRAVENOUS CONTINUOUS
Status: DISCONTINUED | OUTPATIENT
Start: 2024-01-25 | End: 2024-01-25

## 2024-01-25 RX ORDER — PROMETHAZINE HYDROCHLORIDE 12.5 MG/1
12.5 TABLET ORAL EVERY 4 HOURS PRN
Status: DISCONTINUED | OUTPATIENT
Start: 2024-01-25 | End: 2024-01-28 | Stop reason: HOSPADM

## 2024-01-25 RX ORDER — MORPHINE SULFATE 1 MG/ML
INJECTION, SOLUTION EPIDURAL; INTRATHECAL; INTRAVENOUS AS NEEDED
Status: DISCONTINUED | OUTPATIENT
Start: 2024-01-25 | End: 2024-01-25 | Stop reason: SURG

## 2024-01-25 RX ORDER — ACETAMINOPHEN 325 MG/1
650 TABLET ORAL EVERY 6 HOURS
Status: DISCONTINUED | OUTPATIENT
Start: 2024-01-26 | End: 2024-01-28 | Stop reason: HOSPADM

## 2024-01-25 RX ORDER — ONDANSETRON 4 MG/1
4 TABLET, ORALLY DISINTEGRATING ORAL EVERY 8 HOURS PRN
Status: DISCONTINUED | OUTPATIENT
Start: 2024-01-25 | End: 2024-01-28 | Stop reason: HOSPADM

## 2024-01-25 RX ORDER — DIPHENHYDRAMINE HCL 25 MG
25 CAPSULE ORAL EVERY 4 HOURS PRN
Status: DISCONTINUED | OUTPATIENT
Start: 2024-01-25 | End: 2024-01-28 | Stop reason: HOSPADM

## 2024-01-25 RX ORDER — HYDROCORTISONE 25 MG/G
CREAM TOPICAL 3 TIMES DAILY PRN
Status: DISCONTINUED | OUTPATIENT
Start: 2024-01-25 | End: 2024-01-28 | Stop reason: HOSPADM

## 2024-01-25 RX ORDER — ONDANSETRON 2 MG/ML
4 INJECTION INTRAMUSCULAR; INTRAVENOUS EVERY 6 HOURS PRN
Status: DISCONTINUED | OUTPATIENT
Start: 2024-01-25 | End: 2024-01-28 | Stop reason: HOSPADM

## 2024-01-25 RX ORDER — HYDROXYZINE 50 MG/1
50 TABLET, FILM COATED ORAL EVERY 6 HOURS PRN
Status: DISCONTINUED | OUTPATIENT
Start: 2024-01-25 | End: 2024-01-28 | Stop reason: HOSPADM

## 2024-01-25 RX ORDER — CEFAZOLIN SODIUM 2 G/100ML
2 INJECTION, SOLUTION INTRAVENOUS ONCE
Status: COMPLETED | OUTPATIENT
Start: 2024-01-25 | End: 2024-01-25

## 2024-01-25 RX ORDER — ACETAMINOPHEN 500 MG
1000 TABLET ORAL ONCE
Status: COMPLETED | OUTPATIENT
Start: 2024-01-25 | End: 2024-01-25

## 2024-01-25 RX ORDER — OXYTOCIN 10 [USP'U]/ML
INJECTION, SOLUTION INTRAMUSCULAR; INTRAVENOUS
Status: ACTIVE
Start: 2024-01-25 | End: 2024-01-25

## 2024-01-25 RX ORDER — SODIUM CHLORIDE 9 MG/ML
40 INJECTION, SOLUTION INTRAVENOUS AS NEEDED
Status: DISCONTINUED | OUTPATIENT
Start: 2024-01-25 | End: 2024-01-25 | Stop reason: HOSPADM

## 2024-01-25 RX ORDER — SODIUM CHLORIDE 0.9 % (FLUSH) 0.9 %
10 SYRINGE (ML) INJECTION EVERY 12 HOURS SCHEDULED
Status: DISCONTINUED | OUTPATIENT
Start: 2024-01-25 | End: 2024-01-25 | Stop reason: HOSPADM

## 2024-01-25 RX ORDER — OXYCODONE HYDROCHLORIDE 10 MG/1
10 TABLET ORAL EVERY 4 HOURS PRN
Status: DISCONTINUED | OUTPATIENT
Start: 2024-01-25 | End: 2024-01-28 | Stop reason: HOSPADM

## 2024-01-25 RX ORDER — DROPERIDOL 2.5 MG/ML
0.62 INJECTION, SOLUTION INTRAMUSCULAR; INTRAVENOUS
Status: DISCONTINUED | OUTPATIENT
Start: 2024-01-25 | End: 2024-01-28 | Stop reason: HOSPADM

## 2024-01-25 RX ORDER — FAMOTIDINE 10 MG/ML
20 INJECTION, SOLUTION INTRAVENOUS ONCE AS NEEDED
Status: COMPLETED | OUTPATIENT
Start: 2024-01-25 | End: 2024-01-25

## 2024-01-25 RX ORDER — LIDOCAINE HYDROCHLORIDE 10 MG/ML
0.5 INJECTION, SOLUTION INFILTRATION; PERINEURAL ONCE AS NEEDED
Status: DISCONTINUED | OUTPATIENT
Start: 2024-01-25 | End: 2024-01-25 | Stop reason: HOSPADM

## 2024-01-25 RX ORDER — OXYTOCIN/0.9 % SODIUM CHLORIDE 30/500 ML
125 PLASTIC BAG, INJECTION (ML) INTRAVENOUS CONTINUOUS PRN
Status: DISCONTINUED | OUTPATIENT
Start: 2024-01-25 | End: 2024-01-28 | Stop reason: HOSPADM

## 2024-01-25 RX ORDER — MORPHINE SULFATE 4 MG/ML
INJECTION, SOLUTION INTRAMUSCULAR; INTRAVENOUS
Status: COMPLETED | OUTPATIENT
Start: 2024-01-25 | End: 2024-01-25

## 2024-01-25 RX ORDER — HYDROMORPHONE HYDROCHLORIDE 1 MG/ML
0.5 INJECTION, SOLUTION INTRAMUSCULAR; INTRAVENOUS; SUBCUTANEOUS
Status: DISCONTINUED | OUTPATIENT
Start: 2024-01-25 | End: 2024-01-25 | Stop reason: HOSPADM

## 2024-01-25 RX ORDER — PROMETHAZINE HYDROCHLORIDE 25 MG/1
25 TABLET ORAL EVERY 6 HOURS PRN
Status: DISCONTINUED | OUTPATIENT
Start: 2024-01-25 | End: 2024-01-28 | Stop reason: HOSPADM

## 2024-01-25 RX ORDER — ONDANSETRON 2 MG/ML
4 INJECTION INTRAMUSCULAR; INTRAVENOUS ONCE AS NEEDED
Status: COMPLETED | OUTPATIENT
Start: 2024-01-25 | End: 2024-01-25

## 2024-01-25 RX ORDER — OXYTOCIN/0.9 % SODIUM CHLORIDE 30/500 ML
999 PLASTIC BAG, INJECTION (ML) INTRAVENOUS ONCE
Status: COMPLETED | OUTPATIENT
Start: 2024-01-25 | End: 2024-01-25

## 2024-01-25 RX ORDER — ACETAMINOPHEN 500 MG
1000 TABLET ORAL EVERY 6 HOURS
Status: COMPLETED | OUTPATIENT
Start: 2024-01-25 | End: 2024-01-26

## 2024-01-25 RX ORDER — MISOPROSTOL 200 UG/1
800 TABLET ORAL ONCE AS NEEDED
Status: DISCONTINUED | OUTPATIENT
Start: 2024-01-25 | End: 2024-01-25 | Stop reason: HOSPADM

## 2024-01-25 RX ORDER — DIPHENHYDRAMINE HYDROCHLORIDE 50 MG/ML
25 INJECTION INTRAMUSCULAR; INTRAVENOUS EVERY 4 HOURS PRN
Status: DISCONTINUED | OUTPATIENT
Start: 2024-01-25 | End: 2024-01-28 | Stop reason: HOSPADM

## 2024-01-25 RX ORDER — OXYTOCIN/0.9 % SODIUM CHLORIDE 30/500 ML
250 PLASTIC BAG, INJECTION (ML) INTRAVENOUS CONTINUOUS
Status: ACTIVE | OUTPATIENT
Start: 2024-01-25 | End: 2024-01-25

## 2024-01-25 RX ORDER — METHYLERGONOVINE MALEATE 0.2 MG/ML
200 INJECTION INTRAVENOUS ONCE AS NEEDED
Status: DISCONTINUED | OUTPATIENT
Start: 2024-01-25 | End: 2024-01-25 | Stop reason: HOSPADM

## 2024-01-25 RX ORDER — IBUPROFEN 800 MG/1
800 TABLET ORAL EVERY 8 HOURS SCHEDULED
Status: DISCONTINUED | OUTPATIENT
Start: 2024-01-26 | End: 2024-01-28 | Stop reason: HOSPADM

## 2024-01-25 RX ORDER — ONDANSETRON 2 MG/ML
4 INJECTION INTRAMUSCULAR; INTRAVENOUS ONCE AS NEEDED
Status: DISCONTINUED | OUTPATIENT
Start: 2024-01-25 | End: 2024-01-28 | Stop reason: HOSPADM

## 2024-01-25 RX ORDER — KETOROLAC TROMETHAMINE 15 MG/ML
15 INJECTION, SOLUTION INTRAMUSCULAR; INTRAVENOUS EVERY 6 HOURS
Status: COMPLETED | OUTPATIENT
Start: 2024-01-25 | End: 2024-01-26

## 2024-01-25 RX ORDER — NALOXONE HCL 0.4 MG/ML
0.2 VIAL (ML) INJECTION
Status: CANCELLED | OUTPATIENT
Start: 2024-01-25

## 2024-01-25 RX ADMIN — Medication 999 ML/HR: at 09:51

## 2024-01-25 RX ADMIN — MORPHINE SULFATE 2 MG: 1 INJECTION, SOLUTION EPIDURAL; INTRATHECAL; INTRAVENOUS at 10:03

## 2024-01-25 RX ADMIN — MORPHINE SULFATE 2 MG: 1 INJECTION, SOLUTION EPIDURAL; INTRATHECAL; INTRAVENOUS at 09:47

## 2024-01-25 RX ADMIN — SODIUM CHLORIDE, POTASSIUM CHLORIDE, SODIUM LACTATE AND CALCIUM CHLORIDE 1000 ML: 600; 310; 30; 20 INJECTION, SOLUTION INTRAVENOUS at 08:15

## 2024-01-25 RX ADMIN — KETOROLAC TROMETHAMINE 15 MG: 15 INJECTION, SOLUTION INTRAMUSCULAR; INTRAVENOUS at 18:49

## 2024-01-25 RX ADMIN — MORPHINE SULFATE 200 MCG: 4 INJECTION, SOLUTION INTRAMUSCULAR; INTRAVENOUS at 09:29

## 2024-01-25 RX ADMIN — BUPIVACAINE HYDROCHLORIDE 1.6 ML: 7.5 INJECTION, SOLUTION EPIDURAL; RETROBULBAR at 09:29

## 2024-01-25 RX ADMIN — SODIUM CHLORIDE, POTASSIUM CHLORIDE, SODIUM LACTATE AND CALCIUM CHLORIDE: 600; 310; 30; 20 INJECTION, SOLUTION INTRAVENOUS at 09:20

## 2024-01-25 RX ADMIN — ONDANSETRON 4 MG: 2 INJECTION INTRAMUSCULAR; INTRAVENOUS at 08:40

## 2024-01-25 RX ADMIN — MORPHINE SULFATE 1.8 MG: 1 INJECTION, SOLUTION EPIDURAL; INTRATHECAL; INTRAVENOUS at 10:21

## 2024-01-25 RX ADMIN — FAMOTIDINE 20 MG: 10 INJECTION INTRAVENOUS at 08:40

## 2024-01-25 RX ADMIN — ACETAMINOPHEN 1000 MG: 500 TABLET ORAL at 15:59

## 2024-01-25 RX ADMIN — MORPHINE SULFATE 2 MG: 1 INJECTION, SOLUTION EPIDURAL; INTRATHECAL; INTRAVENOUS at 09:39

## 2024-01-25 RX ADMIN — ACETAMINOPHEN 1000 MG: 500 TABLET ORAL at 21:40

## 2024-01-25 RX ADMIN — Medication 999 ML/HR: at 08:40

## 2024-01-25 RX ADMIN — CEFAZOLIN SODIUM 2 G: 2 INJECTION, SOLUTION INTRAVENOUS at 08:40

## 2024-01-25 RX ADMIN — ACETAMINOPHEN 1000 MG: 500 TABLET ORAL at 08:40

## 2024-01-25 RX ADMIN — SODIUM CHLORIDE, POTASSIUM CHLORIDE, SODIUM LACTATE AND CALCIUM CHLORIDE: 600; 310; 30; 20 INJECTION, SOLUTION INTRAVENOUS at 10:36

## 2024-01-25 RX ADMIN — MORPHINE SULFATE 2 MG: 1 INJECTION, SOLUTION EPIDURAL; INTRATHECAL; INTRAVENOUS at 10:16

## 2024-01-25 RX ADMIN — KETOROLAC TROMETHAMINE 30 MG: 30 INJECTION, SOLUTION INTRAMUSCULAR at 09:57

## 2024-01-25 RX ADMIN — AZITHROMYCIN MONOHYDRATE 500 MG: 500 INJECTION, POWDER, LYOPHILIZED, FOR SOLUTION INTRAVENOUS at 08:38

## 2024-01-25 RX ADMIN — Medication 250 ML/HR: at 08:40

## 2024-01-25 NOTE — ANESTHESIA PREPROCEDURE EVALUATION
Anesthesia Evaluation     Patient summary reviewed and Nursing notes reviewed   no history of anesthetic complications:   NPO Solid Status: > 8 hours  NPO Liquid Status: > 2 hours           Airway   Mallampati: II  TM distance: >3 FB  Neck ROM: full  Dental      Pulmonary    Cardiovascular         Neuro/Psych  GI/Hepatic/Renal/Endo    (+) diabetes mellitus gestational    Musculoskeletal     Abdominal    Substance History      OB/GYN    (+) Pregnant    Comment: Breech presentation      Other                    Anesthesia Plan    ASA 2     spinal     (35w4d)    Anesthetic plan, risks, benefits, and alternatives have been provided, discussed and informed consent has been obtained with: patient.    CODE STATUS:    Level Of Support Discussed With: Patient  Code Status (Patient has no pulse and is not breathing): CPR (Attempt to Resuscitate)  Medical Interventions (Patient has pulse or is breathing): Full Support

## 2024-01-25 NOTE — LACTATION NOTE
This note was copied from a baby's chart.  P1,35.4 weeks baby- new admission. Attempted to help mom to BF baby, but she is too sleepy. After using a hand pump to humaira mom's nipples(they are almost flat)changing different position and trying multiple rousing techniques- NS24 mm attempted. Instruction on placement, use and washing given, but even with the NS infant is not able to latch, but only holding it in her mouth. Finally HGP initiated at this time with instructions of use, cleaning the parts and milk storage. Encouraged PT to pump every 3h for 15 min.on each breast if baby is not BF. Educated on the importance of stimulation for adequate milk supply. PT denies any questions. She has PBP. Encouraged her to call if needing further help.

## 2024-01-25 NOTE — OBED NOTES
"[facility]   Amol  : 1995  MRN: [id number]  CSN: [id number]    OB ED Provider Note    Subjective   Chief Complaint   Patient presents with    Rupture of Membranes     To [facility] with c/o SROM at 0500. Denies ctx. States baby breech last office visit      Amol is a 28 y.o. year old  with an Estimated Date of Delivery: 24 currently at 35w4d presenting with possible SROM at 0500.  She awakened in a puddle of clear fluid. She denies CTX. No VB noted. FM is present.    Prenatal care has been with Dr. Thomas.  It has been complicated by malpresentation (breech) and diet controlled GDM.    OB History    Para Term  AB Living   1 0 0 0 0 0   SAB IAB Ectopic Molar Multiple Live Births   0 0 0 0 0 0      # Outcome Date GA Lbr Nir/2nd Weight Sex Delivery Anes PTL Lv   1 Current              Past Medical History:   Diagnosis Date    Anxiety      Past Surgical History:   Procedure Laterality Date    TONSILLECTOMY       No current facility-administered medications for this encounter.    No Known Allergies  Social History    Tobacco Use      Smoking status: Never      Smokeless tobacco: Not on file    Review of Systems   Genitourinary:  Positive for vaginal discharge.   All other systems reviewed and are negative.        Objective   /87 (BP Location: Right arm, Patient Position: Sitting)   Pulse 105   Temp 99.1 °F (37.3 °C) (Oral)   Resp 16   Ht 160 cm (63\")   LMP 2023 (Exact Date)   BMI 25.83 kg/m²   General: well developed; well nourished  no acute distress   Abdomen: soft, non-tender; no masses  gravid    FHT's: reactive and category 1      Cervix: was checked (by RN): 0.5 cm / 0 % / -2   Presentation: Breech via BS u/s   Contractions: irregular   Chest: Unlabored respirations    CV:  Mild tachycardia, RR   Ext:   No C/C/E   Back: CVA tenderness is deferred bilateral        Prenatal Labs  Lab Results   Component Value Date    HGB 12.2 2023    RUBELLAABIGG " 11.60 2023    HEPBSAG Non-Reactive 2023    ABSCRN Negative 2023    KDR0BPS4 Non-Reactive 2023    HEPCVIRUSABY Non-Reactive 2023     (H) 2023    GGTFASTING 72 2023    XMG0XKHE 180 (H) 2023    YCI1BWJZ 196 (H) 2023    XZJ4LFSM 160 (H) 2023    URINECX No growth 2023    CHLAMNAA Negative 2024    NGONORRHON Negative 2024       Current Labs Reviewed   UA:    Lab Results   Component Value Date    SQUAMEPIUA None Seen 2023    SPECGRAVUR <1.005 (L) 2023    KETONESU Negative 2024    BLOODU Negative 2023    LEUKOCYTESUR Small (1+) (A) 2024    NITRITEU Negative 2023    RBCUA None Seen 2023    WBCUA 0-2 2023    BACTERIA 1+ (A) 2023     ROM+ positive     Assessment   IUP at 35w4d  PPROM with persistent breech     Plan   Admit to L&D for primary . Obtain admission bloodwork, start IV fluids. Dr. Butler notified and is assuming management.     Nishant Frey MD  2024  06:40 EST

## 2024-01-25 NOTE — ANESTHESIA PROCEDURE NOTES
Spinal Block      Patient reassessed immediately prior to procedure    Patient location during procedure: OR  Stop Time: 1/25/2024 9:29 AM  Performed By  CRNA/CAA: Lilliam Bass CRNA  Preanesthetic Checklist  Completed: patient identified, IV checked, risks and benefits discussed, surgical consent, monitors and equipment checked, pre-op evaluation and timeout performed  Spinal Block Prep:  Patient Position:sitting  Sterile Tech:cap, gloves and mask  Prep:Chloraprep  Patient Monitoring:blood pressure monitoring and continuous pulse oximetry    Spinal Block Procedure  Approach:midline  Guidance:landmark technique and palpation technique  Location:L4-L5  Needle Type:Criss  Needle Gauge:25 G  Placement of Spinal needle event:cerebrospinal fluid aspirated  Paresthesia: no  Fluid Appearance:clear  Medications: Morphine sulfate (PF) injection - Spinal   200 mcg - 1/25/2024 9:29:00 AM  bupivacaine PF (MARCAINE) 0.75 % injection - Spinal   1.6 mL - 1/25/2024 9:29:00 AM   Post Assessment  Patient Tolerance:patient tolerated the procedure well with no apparent complications  Complications no

## 2024-01-25 NOTE — L&D DELIVERY NOTE
SECTION FULL OPERATIVE REPORT  Pre-operative Diagnosis: 35-week intrauterine pregnancy, premature rupture of membranes, breech presentation  Post-operative Diagnosis: Same, delivered, nuchal cord x 1  Procedure: Primary low-transverse  Section  Anesthesia: Spinal  Surgeon(s): SATURNINO Guerrero  Assistant(s):   Assistant: Wendy River  was responsible for performing the following activities: Retraction, Suction, Irrigation, Suturing, and Delivery of Fetus and their skilled assistance was necessary for the success of this case.  Gestational age 35 weeks 4 days  Active Hospital Problems    Diagnosis  POA    **Pregnant [Z34.90]  Not Applicable    Premature rupture of membranes with onset of labor within 24 hours of rupture [O42.00]  Unknown    Nuchal cord affecting delivery [O69.81X0]  Unknown    Breech presentation, no version [O32.1XX0]  Unknown    Diet controlled gestational diabetes mellitus (GDM), antepartum [O24.410]  Unknown     Infant    Findings: female  infant     Weight: 2610 g (5 lb 12.1 oz)   Length:   in  Observations/Comments:  Panda OR 3      8  @ 1 minute /   9  @ 5 minutes          Complications: none  Specimens: Placenta  EBL: See epic for quantitative blood loss      Description of Procedure:After reviewing risks,benefits, and possible complications, the patient was taken to the operating room where anesthesia was found to be adequate. She was prepped and draped in the usual sterile fashion in the dorsal supine position with a leftward tilt.   A Pfannenstiel skin incision was made with the scalpel and carried through the fat to the underlying layer of fascia. The fascia was then incised in the midline and the incision was extended laterally with Melo scissors. The superior aspect of the fascia was then grasped with Kocher clamps and the underlying rectus muscles were then dissected off bluntly and  with the Melo scissors. The inferior aspect of the fascia was then grasped with Kocher  clamps and dissected off similarly with Melo scissors. The rectus muscles were  and the peritoneum  entered. The peritoneal incision was then carried superiorly and inferiorly taking care to identify the bladder at the lower aspect.   The bladder blade was inserted. The vesicouterine peritoneum was then identified and entered sharply with Metzenbaum scissors and a bladder flap was created.. The bladder blade was reinserted and the uterine incision was made down to the chorionic laeve. This was extended with the bandage scissors. Membranes were then ruptured with an Allis clamp.  The bladder blade was removed .  Breech was easily delivered, body was wrapped in a moist towel right arm was delivered, infant was rotated 180 degrees and opposite arm was easily delivered.  Head was placed in a flexed position and vertex easily delivered.  There was a nuchal cord which was loose and subsequently reduced. The infant was moving all four extremities.The nose and mouth were suctioned and the cord was clamped and cut. The infant was taken to the warmer for the   staff, who were present via hospital policy. Cord blood was collected. The placenta was removed, and the uterus was cleared of all clots and debris. The uterine incision was repaired in two layers using 0 chromic suture. The uterus was examined and noted to be hemostatic.  The posterior cul-de-sac and gutters were cleared of all clots and debris. The uterus,tubes and ovaries appeared normal. The uterine incision was  then re-inspected to ensure hemostasis as were all subfascial tissues. Vesic-uterine peritoneum was closed with a 3-0 vicryl suture. All counts were correct and parietal peritoneum was closed with a 3-0 vicryl suture.The fascia was re-approximated in a running fashion using 0-vicryl suture. Three interrupted 0 vicryl sutures were placed laterally and in the midline for reinforcement. The subcutaneous tissue was irrigated,  re-approximated  in a running fashion with 3-0 vicryl. The skin was closed with 4-0 vicryl. Bandages were applied. Lindsay was draining clear urine.    The patient tolerated the procedure well. Sponge, lap and needle counts were correct. The patient was taken to recovery in stable condition.    Gab Guerrero MD    1/25/2024  10:26 EST

## 2024-01-25 NOTE — H&P
Admission date 2024     Patient: Lorri Carmichael MRN: [id number]   YOB: 1995 Age: 28 y.o. Sex: female     Chief Complaint   Patient presents with    Rupture of Membranes     To [facility] with c/o SROM at 0500. Denies ctx. States baby breech last office visit       HPI:    Lorri Carmichael is a 28 y.o.,  AT 35w4d admitted for PPROM @35wks Pt known to be breech. Denies vaginal bleeding, leakage of fluid, or contractions. Admits to good fetal movement.       Pregnant    OB History    Para Term  AB Living   1             SAB IAB Ectopic Molar Multiple Live Births                    # Outcome Date GA Lbr Nir/2nd Weight Sex Delivery Anes PTL Lv   1 Current              Past Medical History:   Diagnosis Date    Anxiety      Past Surgical History:   Procedure Laterality Date    TONSILLECTOMY       No current facility-administered medications on file prior to encounter.     Current Outpatient Medications on File Prior to Encounter   Medication Sig Dispense Refill    Blood Glucose Monitoring Suppl (ONE TOUCH ULTRA 2) w/Device kit USE AS DIRECTED TO TEST GLUCOSE 4 TIMES DAILY FASTING, ONE HOUR AFTER MEALS AND AS NEEDED      glucose blood test strip Use as instructed to test glucose fasting, one hour after meals and as needed. 140 each 4    glucose monitor monitoring kit Use 1 each As Needed (use to test glucose 4 times daily). Fasting, one hour after meals and as needed. 1 each 0    Lancets 30G misc Use 1 Device 4 (Four) Times a Day. Fasting, one hour after meals and as needed. 140 each 4    Prenatal Vit-Fe Sulfate-FA-DHA (Prenatal Vitamin/Min +DHA) 27-0.8-200 MG capsule Take 1 tablet by mouth Daily. And 1 capsule daily 60 capsule 11       ROS:      Except as outlined in history of physical illness, patient denies any changes in her GYN, , GI systems. All other systems reviewed are negative.      OBJECTIVE:     Vitals:   Vitals:    24 0636 24 0814   BP: 123/87    BP Location:  "Right arm    Patient Position: Sitting    Pulse: 105    Resp: 16    Temp: 99.1 °F (37.3 °C)    TempSrc: Oral    Height: 160 cm (63\") 160 cm (63\")         Appearance/Psychiatric: In no distress   Constitutional: The patient is well nourished   Cardiovascular: She does not have edema. Heart RRR  Respiratory: Respiratory effort is normal. CTAB   Abdomen: Soft, gravid.  Ext: nontender, no edema. +2/4 bilateral patellar reflexes   Cx; deferredf positive amniotic fluid        LOS: 0 days    Gab Guerrero MD   2024    Assessment and Plan:   Pregnant [Z34.90]   35wk PPROM  Breech   Plan LTCS R/B/A discussed        "

## 2024-01-25 NOTE — ANESTHESIA POSTPROCEDURE EVALUATION
Patient:  Amol    Procedure Summary       Date: 24 Room / Location:  [facility] /  [facility]    Anesthesia Start: 920 Anesthesia Stop:     Procedure:  SECTION PRIMARY (Abdomen) Diagnosis:       Diet controlled gestational diabetes mellitus (GDM), antepartum      Breech presentation, single or unspecified fetus      (Diet controlled gestational diabetes mellitus (GDM), antepartum [O24.410])      (Breech presentation, single or unspecified fetus [O32.1XX0])    Surgeons: Gab Guerrero MD Provider: Faustino Boggs MD    Anesthesia Type: spinal ASA Status: 2            Anesthesia Type: spinal    Vitals  Vitals Value Taken Time   /60 24 1136   Temp 36.7 °C (98 °F) 24 1035   Pulse 73 24 1139   Resp 16 24 1035   SpO2 95 % 24 1139           Post Anesthesia Care and Evaluation    Patient location during evaluation: bedside  Patient participation: complete - patient participated  Level of consciousness: awake and alert  Pain management: adequate    Airway patency: patent  Anesthetic complications: No anesthetic complications  PONV Status: controlled  Cardiovascular status: blood pressure returned to baseline and acceptable  Respiratory status: acceptable  Hydration status: acceptable

## 2024-01-25 NOTE — PLAN OF CARE
Problem: Adult Inpatient Plan of Care  Goal: Plan of Care Review  Outcome: Ongoing, Progressing  Goal: Patient-Specific Goal (Individualized)  Outcome: Ongoing, Progressing  Goal: Absence of Hospital-Acquired Illness or Injury  Outcome: Ongoing, Progressing  Goal: Optimal Comfort and Wellbeing  Outcome: Ongoing, Progressing  Goal: Readiness for Transition of Care  Outcome: Ongoing, Progressing     Problem: Device-Related Complication Risk (Anesthesia/Analgesia, Neuraxial)  Goal: Safe Infusion Delivery Completion  Outcome: Ongoing, Progressing     Problem: Infection (Anesthesia/Analgesia, Neuraxial)  Goal: Absence of Infection Signs and Symptoms  Outcome: Ongoing, Progressing     Problem: Nausea and Vomiting (Anesthesia/Analgesia, Neuraxial)  Goal: Nausea and Vomiting Relief  Outcome: Ongoing, Progressing     Problem: Pain (Anesthesia/Analgesia, Neuraxial)  Goal: Effective Pain Control  Outcome: Ongoing, Progressing     Problem: Respiratory Compromise (Anesthesia/Analgesia, Neuraxial)  Goal: Effective Oxygenation and Ventilation  Outcome: Ongoing, Progressing     Problem: Sensorimotor Impairment (Anesthesia/Analgesia, Neuraxial)  Goal: Baseline Motor Function  Outcome: Ongoing, Progressing     Problem: Urinary Retention (Anesthesia/Analgesia, Neuraxial)  Goal: Effective Urinary Elimination  Outcome: Ongoing, Progressing     Problem: Bleeding ( Delivery)  Goal: Bleeding is Controlled  Outcome: Ongoing, Progressing     Problem: Change in Fetal Wellbeing ( Delivery)  Goal: Stable Fetal Wellbeing  Outcome: Ongoing, Progressing     Problem: Infection ( Delivery)  Goal: Absence of Infection Signs and Symptoms  Outcome: Ongoing, Progressing     Problem: Respiratory Compromise ( Delivery)  Goal: Effective Oxygenation and Ventilation  Outcome: Ongoing, Progressing   Goal Outcome Evaluation:

## 2024-01-26 PROBLEM — O42.00 PREMATURE RUPTURE OF MEMBRANES WITH ONSET OF LABOR WITHIN 24 HOURS OF RUPTURE: Status: RESOLVED | Noted: 2024-01-25 | Resolved: 2024-01-26

## 2024-01-26 PROBLEM — Z34.90 PREGNANT: Status: RESOLVED | Noted: 2024-01-25 | Resolved: 2024-01-26

## 2024-01-26 LAB
BACTERIA SPEC AEROBE CULT: NORMAL
BASOPHILS # BLD AUTO: 0.03 10*3/MM3 (ref 0–0.2)
BASOPHILS NFR BLD AUTO: 0.2 % (ref 0–1.5)
DEPRECATED RDW RBC AUTO: 44.5 FL (ref 37–54)
EOSINOPHIL # BLD AUTO: 0.03 10*3/MM3 (ref 0–0.4)
EOSINOPHIL NFR BLD AUTO: 0.2 % (ref 0.3–6.2)
ERYTHROCYTE [DISTWIDTH] IN BLOOD BY AUTOMATED COUNT: 13.1 % (ref 12.3–15.4)
HCT VFR BLD AUTO: 29.4 % (ref 34–46.6)
HGB BLD-MCNC: 10 G/DL (ref 12–15.9)
IMM GRANULOCYTES # BLD AUTO: 0.07 10*3/MM3 (ref 0–0.05)
IMM GRANULOCYTES NFR BLD AUTO: 0.5 % (ref 0–0.5)
LYMPHOCYTES # BLD AUTO: 1.14 10*3/MM3 (ref 0.7–3.1)
LYMPHOCYTES NFR BLD AUTO: 8.3 % (ref 19.6–45.3)
MCH RBC QN AUTO: 32.4 PG (ref 26.6–33)
MCHC RBC AUTO-ENTMCNC: 34 G/DL (ref 31.5–35.7)
MCV RBC AUTO: 95.1 FL (ref 79–97)
MONOCYTES # BLD AUTO: 0.89 10*3/MM3 (ref 0.1–0.9)
MONOCYTES NFR BLD AUTO: 6.5 % (ref 5–12)
NEUTROPHILS NFR BLD AUTO: 11.63 10*3/MM3 (ref 1.7–7)
NEUTROPHILS NFR BLD AUTO: 84.3 % (ref 42.7–76)
NRBC BLD AUTO-RTO: 0 /100 WBC (ref 0–0.2)
PLATELET # BLD AUTO: 207 10*3/MM3 (ref 140–450)
PMV BLD AUTO: 9.8 FL (ref 6–12)
RBC # BLD AUTO: 3.09 10*6/MM3 (ref 3.77–5.28)
WBC NRBC COR # BLD AUTO: 13.79 10*3/MM3 (ref 3.4–10.8)

## 2024-01-26 PROCEDURE — 85025 COMPLETE CBC W/AUTO DIFF WBC: CPT | Performed by: OBSTETRICS & GYNECOLOGY

## 2024-01-26 PROCEDURE — 0503F POSTPARTUM CARE VISIT: CPT | Performed by: STUDENT IN AN ORGANIZED HEALTH CARE EDUCATION/TRAINING PROGRAM

## 2024-01-26 PROCEDURE — 25010000002 KETOROLAC TROMETHAMINE PER 15 MG: Performed by: OBSTETRICS & GYNECOLOGY

## 2024-01-26 RX ORDER — DOCUSATE SODIUM 100 MG/1
100 CAPSULE, LIQUID FILLED ORAL 2 TIMES DAILY
Status: DISCONTINUED | OUTPATIENT
Start: 2024-01-26 | End: 2024-01-28 | Stop reason: HOSPADM

## 2024-01-26 RX ADMIN — ACETAMINOPHEN 1000 MG: 500 TABLET ORAL at 04:00

## 2024-01-26 RX ADMIN — KETOROLAC TROMETHAMINE 15 MG: 15 INJECTION, SOLUTION INTRAMUSCULAR; INTRAVENOUS at 09:06

## 2024-01-26 RX ADMIN — KETOROLAC TROMETHAMINE 15 MG: 15 INJECTION, SOLUTION INTRAMUSCULAR; INTRAVENOUS at 15:23

## 2024-01-26 RX ADMIN — IBUPROFEN 800 MG: 800 TABLET, FILM COATED ORAL at 20:52

## 2024-01-26 RX ADMIN — ACETAMINOPHEN 650 MG: 325 TABLET, FILM COATED ORAL at 16:00

## 2024-01-26 RX ADMIN — DOCUSATE SODIUM 100 MG: 100 CAPSULE, LIQUID FILLED ORAL at 20:52

## 2024-01-26 RX ADMIN — KETOROLAC TROMETHAMINE 15 MG: 15 INJECTION, SOLUTION INTRAMUSCULAR; INTRAVENOUS at 01:09

## 2024-01-26 RX ADMIN — ACETAMINOPHEN 1000 MG: 500 TABLET ORAL at 10:40

## 2024-01-26 NOTE — PLAN OF CARE
Goal Outcome Evaluation:  Plan of Care Reviewed With: patient, spouse        Progress: improving  Outcome Evaluation: VS Stable, Lindsay removed, up to the bathroom, due to void, lochia WNL, pain controlled with PO meds

## 2024-01-26 NOTE — LACTATION NOTE
This note was copied from a baby's chart.  Rounded on patient, infant currently in nursery for testing.  Patient reports infant is taking bottle well and has latched with the NS a few times.  Offered SNS and patient would like to try at the next feeding.  LC number on WB, encouraged patient to call when ready.

## 2024-01-26 NOTE — PROGRESS NOTES
2024    Name:Lorri Carmichael    MR#:[id number]     Progress Note:  Post-Op Day 1 S/P    HD:1    Subjective   28 y.o. yo Female  s/p CS at 35w4d doing well. Pain well controlled. Tolerating regular diet and having flatus. Lochia normal.     Patient Active Problem List   Diagnosis    Subchorionic hemorrhage of placenta in first trimester, single or unspecified fetus    Diet controlled gestational diabetes mellitus (GDM), antepartum        Objective    Vitals  Temp:  Temp:  [98 °F (36.7 °C)-99.6 °F (37.6 °C)] 98 °F (36.7 °C)  Temp src: Oral  BP:  BP: ()/(60-74) 110/73  Pulse:  Heart Rate:  [] 82  RR:   Resp:  [16-18] 18  Weight: 64.9 kg (143 lb)  BMI:  Body mass index is 25.33 kg/m².      General Awake, alert, no distress  Abdomen Soft, non-distended, fundus firm, below umbilicus, appropriately tender  Incision  Intact, no erythema or exudate  Extremities Calves NT bilaterally         I/O last 3 completed shifts:  In: 750 [I.V.:500; IV Piggyback:250]  Out: 2846 [Urine:2650; Blood:196]    LABS:   Lab Results   Component Value Date    WBC 13.79 (H) 2024    HGB 10.0 (L) 2024    HCT 29.4 (L) 2024    MCV 95.1 2024     2024       Infant: female       Assessment   1.  POD 1 VSS. Doing well, bandage removed, steri strips in place. Incision healing well. Attempting breastfeeding, supplementing with bottle.           Plan: Doing well.  Routine postoperative care      Diet controlled gestational diabetes mellitus (GDM), antepartum      Esperanza Chow MD  2024 10:18 EST

## 2024-01-27 LAB — B-HEM STREP SPEC QL CULT: NEGATIVE

## 2024-01-27 PROCEDURE — 0503F POSTPARTUM CARE VISIT: CPT | Performed by: OBSTETRICS & GYNECOLOGY

## 2024-01-27 RX ADMIN — DOCUSATE SODIUM 100 MG: 100 CAPSULE, LIQUID FILLED ORAL at 21:53

## 2024-01-27 RX ADMIN — IBUPROFEN 800 MG: 800 TABLET, FILM COATED ORAL at 21:53

## 2024-01-27 RX ADMIN — ACETAMINOPHEN 650 MG: 325 TABLET, FILM COATED ORAL at 15:41

## 2024-01-27 RX ADMIN — ACETAMINOPHEN 650 MG: 325 TABLET, FILM COATED ORAL at 08:43

## 2024-01-27 RX ADMIN — IBUPROFEN 800 MG: 800 TABLET, FILM COATED ORAL at 05:21

## 2024-01-27 RX ADMIN — ACETAMINOPHEN 650 MG: 325 TABLET, FILM COATED ORAL at 00:46

## 2024-01-27 RX ADMIN — DOCUSATE SODIUM 100 MG: 100 CAPSULE, LIQUID FILLED ORAL at 08:43

## 2024-01-27 RX ADMIN — IBUPROFEN 800 MG: 800 TABLET, FILM COATED ORAL at 13:56

## 2024-01-27 RX ADMIN — ACETAMINOPHEN 650 MG: 325 TABLET, FILM COATED ORAL at 21:53

## 2024-01-27 NOTE — LACTATION NOTE
This note was copied from a baby's chart.  Informed PT that LC is available to help with BF until 2300. Mother reports baby BF some , but has been sleepy, so she is BF and pumping with the HGP and supplementing with formula. Mother denies any questions. Encouraged to call if needing help.

## 2024-01-27 NOTE — PROGRESS NOTES
[facility]   PROGRESS NOTE    Post-Op Day 2 S/P     Subjective   CC: post  section    Patient reports:  Patient is doing well.  Her pain is controlled with nonnarcotic pain medicine.  She is ambulating and has had a bowel movement.  Her moods are good.  She is voiding and has a regular diet.   She is ambulating without assistance.    Review of systems- no shortness of breath, nausea or vomiting or leg pain.    Objective      Vitals: Vital Signs Range for the last 24 hours  Temperature: Temp:  [97.7 °F (36.5 °C)-98.3 °F (36.8 °C)] 98.3 °F (36.8 °C)       BP: BP: (102-108)/(66-72) 108/72   Pulse: Heart Rate:  [72-82] 74   Respirations: Resp:  [16-18] 16                            Physical exam   General-  no acute distress, conversant    Lungs- respirations unlabored   CV- trace LE edema   Abdomen- soft, nontender, nondistended.  Fundus is firm.   Incision -  Clean, dry, and intact with no erythema.   Lower extremities- nontender bilaterally    Results from last 7 days   Lab Units 24  0857   WBC 10*3/mm3 13.79*   HEMOGLOBIN g/dL 10.0*   HEMATOCRIT % 29.4*   PLATELETS 10*3/mm3 207         Assessment & Plan        Diet controlled gestational diabetes mellitus (GDM), antepartum      Assessment:    Lorri Carmichael is Day 2  post-op from    -patient had an early delivery for PPROM at 35 weeks with breech presentation    Patient is doing well.     Plan:  continue post op care, pain medication prn and encouraged ambulation.        Abdoul Willis MD  2024  08:32 EST

## 2024-01-28 VITALS
BODY MASS INDEX: 25.34 KG/M2 | RESPIRATION RATE: 16 BRPM | OXYGEN SATURATION: 99 % | HEIGHT: 63 IN | HEART RATE: 62 BPM | TEMPERATURE: 98.1 F | DIASTOLIC BLOOD PRESSURE: 82 MMHG | SYSTOLIC BLOOD PRESSURE: 120 MMHG | WEIGHT: 143 LBS

## 2024-01-28 PROCEDURE — 0503F POSTPARTUM CARE VISIT: CPT | Performed by: OBSTETRICS & GYNECOLOGY

## 2024-01-28 RX ORDER — IBUPROFEN 800 MG/1
800 TABLET ORAL EVERY 8 HOURS SCHEDULED
Qty: 24 TABLET | Refills: 0 | Status: SHIPPED | OUTPATIENT
Start: 2024-01-28

## 2024-01-28 RX ORDER — OXYCODONE HYDROCHLORIDE 5 MG/1
5 TABLET ORAL EVERY 6 HOURS PRN
Qty: 10 TABLET | Refills: 0 | Status: SHIPPED | OUTPATIENT
Start: 2024-01-28 | End: 2024-01-31

## 2024-01-28 RX ORDER — PSEUDOEPHEDRINE HCL 30 MG
100 TABLET ORAL 2 TIMES DAILY PRN
Qty: 10 EACH | Refills: 0 | Status: SHIPPED | OUTPATIENT
Start: 2024-01-28

## 2024-01-28 RX ADMIN — IBUPROFEN 800 MG: 800 TABLET, FILM COATED ORAL at 08:31

## 2024-01-28 RX ADMIN — ACETAMINOPHEN 650 MG: 325 TABLET, FILM COATED ORAL at 04:30

## 2024-01-28 RX ADMIN — DOCUSATE SODIUM 100 MG: 100 CAPSULE, LIQUID FILLED ORAL at 08:32

## 2024-01-28 NOTE — PLAN OF CARE
Goal Outcome Evaluation:           Progress: improving  Outcome Evaluation: VSS. Up ad leonard, voiding without difficulty. Lochia WNL, pain controlled with PO meds. Continues to pump and feed formula.

## 2024-01-28 NOTE — LACTATION NOTE
This note was copied from a baby's chart.  PT is going home today. Reports she is still pumping and her milk is in. She is slightly engorged, so ice packs given.  Informed mom about the [facility] info on the back of the edicational booklet. Discussed engourgement, pumping, milk storage and when to expect mature milk. PT denieis any questions.

## 2024-01-28 NOTE — DISCHARGE INSTR - APPOINTMENTS
Please schedule a follow up appointment with your primary OB in 2 weeks or as instructed by discharging physician.

## 2024-01-28 NOTE — DISCHARGE SUMMARY
OB Discharge Summary C/S    This  female, was admitted on 2024 and underwent a , Low Transverse  on 2024 , resulting in the birth of the following:  Infant         Weight:   Birth Information  YOB: 2022   Time of birth: 9:49 AM   Delivering clinician: Gab Guerrero   Sex: female   Delivery type: , Low Transverse   Breech type (if applicable):     Observed anomalies/comments: Panda OR 3         Observations/Comments:  Panda OR 3      Apgars: 8  @ 1 minute /    9  @ 5 minutes     LABS:   Lab Results (last 72 hours)       Procedure Component Value Units Date/Time    Urine Culture - Urine, Urine, Clean Catch [[id number]] Collected: 24    Specimen: Urine, Clean Catch Updated: 24     Urine Culture 25,000 CFU/mL Normal Urogenital Slime    Narrative:      Colonization of the urinary tract without infection is common. Treatment is discouraged unless the patient is symptomatic, pregnant, or undergoing an invasive urologic procedure.    CBC & Differential [[id number]]  (Abnormal) Collected: 24    Specimen: Blood Updated: 24    Narrative:      The following orders were created for panel order CBC & Differential.  Procedure                               Abnormality         Status                     ---------                               -----------         ------                     CBC Auto Differential[[id number]]        Abnormal            Final result                 Please view results for these tests on the individual orders.    CBC Auto Differential [[id number]]  (Abnormal) Collected: 24    Specimen: Blood Updated: 24     WBC 13.79 10*3/mm3      RBC 3.09 10*6/mm3      Hemoglobin 10.0 g/dL      Hematocrit 29.4 %      MCV 95.1 fL      MCH 32.4 pg      MCHC 34.0 g/dL      RDW 13.1 %      RDW-SD 44.5 fl      MPV 9.8 fL      Platelets 207 10*3/mm3      Neutrophil % 84.3 %      Lymphocyte % 8.3 %      Monocyte % 6.5  %      Eosinophil % 0.2 %      Basophil % 0.2 %      Immature Grans % 0.5 %      Neutrophils, Absolute 11.63 10*3/mm3      Lymphocytes, Absolute 1.14 10*3/mm3      Monocytes, Absolute 0.89 10*3/mm3      Eosinophils, Absolute 0.03 10*3/mm3      Basophils, Absolute 0.03 10*3/mm3      Immature Grans, Absolute 0.07 10*3/mm3      nRBC 0.0 /100 WBC             ROS:  Pulm: neg for soa  GI: neg for heavy bleeding              Musculoskel: neg for leg pain        OB History    Para Term  AB Living   1 1   1   1   SAB IAB Ectopic Molar Multiple Live Births           0 1      # Outcome Date GA Lbr Nir/2nd Weight Sex Delivery Anes PTL Lv   1  24 35w4d  2610 g (5 lb 12.1 oz) F CS-LTranv Spinal N CIERRA      Birth Comments: Panda OR 3        Discharge diagnosis:     Medical Problems       Hospital Problem List       Diet controlled gestational diabetes mellitus (GDM), antepartum    Diabetes is improving with treatment.   Continue current treatment regimen.  Diabetes will be reassessed in 1 month.                                                          Pregnant [Z34.90]                                                   section at 35w4d, uncomplicated recovery      Post operatively the patient did well. She was tolerating a regular diet, ambulating, voiding and passing flatus. Post op hemoglobin was   Lab Results   Component Value Date    HGB 10.0 (L) 2024   .     On day of discharge, uterus was firm, incision was clean, dry and intact, extremities were non tender with no erythema or masses.     Pt was given prescriptions for roxicodone and Motrin PRN for pain.    Instructed to call the office to make an appointment in 2  and 6  Weeks and to    please call the office, or the OB/GYN on-call if after-hours, with any questions,concerns or  any of the followin) Fever - a temperature greater than 100.4  2) Increased pain  3) Heavy vaginal bleeding (soaking more than 1 pad in an hour)  4)  Foul-smelling discharge from the vagina  5) Red, painful incision or foul-smelling discharge from incision.    She was instructed to  not place anything in the vagina -  Including  tampons, douches or having sex - until after her  6 week postpartum visit to prevent infection.    Wash your incision everyday with warm water and gentle soap. You may pat it dry.

## 2024-01-30 ENCOUNTER — TELEPHONE (OUTPATIENT)
Dept: OBSTETRICS AND GYNECOLOGY | Age: 29
End: 2024-01-30

## 2024-01-30 NOTE — TELEPHONE ENCOUNTER
----- Message from Lorri Carmichael sent at 1/30/2024  2:10 PM EST -----  Regarding: Appointment Request  Contact: [phone]  Appointment Request From: Lorri Carmichael    With Provider: Svetlana Thomas [[facility]]    Preferred Date Range: Any    Preferred Times: Any Time    Reason for visit: In-Office Procedure    Comments:  C section follow up?

## 2024-02-06 ENCOUNTER — MATERNAL SCREENING (OUTPATIENT)
Dept: CALL CENTER | Facility: HOSPITAL | Age: 29
End: 2024-02-06
Payer: COMMERCIAL

## 2024-02-06 ENCOUNTER — POSTPARTUM VISIT (OUTPATIENT)
Dept: OBSTETRICS AND GYNECOLOGY | Age: 29
End: 2024-02-06
Payer: COMMERCIAL

## 2024-02-06 VITALS
BODY MASS INDEX: 22.43 KG/M2 | SYSTOLIC BLOOD PRESSURE: 106 MMHG | DIASTOLIC BLOOD PRESSURE: 70 MMHG | WEIGHT: 126.6 LBS | HEIGHT: 63 IN

## 2024-02-06 DIAGNOSIS — N94.2 VAGINISMUS: ICD-10-CM

## 2024-02-06 PROBLEM — IMO0001 SUBCHORIONIC HEMORRHAGE OF PLACENTA IN FIRST TRIMESTER, SINGLE OR UNSPECIFIED FETUS: Status: RESOLVED | Noted: 2023-07-20 | Resolved: 2024-02-06

## 2024-02-06 PROBLEM — Z87.51 HISTORY OF PRETERM DELIVERY: Status: ACTIVE | Noted: 2024-02-06

## 2024-02-06 PROBLEM — O46.8X1 SUBCHORIONIC HEMORRHAGE OF PLACENTA IN FIRST TRIMESTER, SINGLE OR UNSPECIFIED FETUS: Status: RESOLVED | Noted: 2023-07-20 | Resolved: 2024-02-06

## 2024-02-06 PROBLEM — O41.8X10 SUBCHORIONIC HEMORRHAGE OF PLACENTA IN FIRST TRIMESTER, SINGLE OR UNSPECIFIED FETUS: Status: RESOLVED | Noted: 2023-07-20 | Resolved: 2024-02-06

## 2024-02-06 NOTE — PROGRESS NOTES
"Chief Complaint   Patient presents with    Postpartum Care     1.5 weeks Postpartum check,  section, Baby GIRL (5lb 12.1oz), Pt is currently Pumping, Pt has no complaints today, Doing well         HPI   Amol is a 28 y.o. female is scheduled for postop check after c/s. She presented with PPROM at 35 weeks and had c/s for breech. She denies heavy bleeding and notes mood is stable.         The following portions of the patient's history were reviewed and updated as appropriate: allergies, current medications, past family history, past medical history, past social history, past surgical history, and problem list.    Review of Systems  Pertinent items are noted in HPI.    /70   Ht 160 cm (63\")   Wt 57.4 kg (126 lb 9.6 oz)   LMP 2023 (Exact Date)   Breastfeeding Yes Comment: Going well  BMI 22.43 kg/m²         Physical Exam  Constitutional:       Appearance: Normal appearance.   Pulmonary:      Effort: Pulmonary effort is normal.   Abdominal:      Palpations: Abdomen is soft.      Tenderness: There is no abdominal tenderness. There is no guarding.      Comments: Incision is clean/dry/intact; no erythema or swelling   Musculoskeletal:      Comments: Bilateral lower ext are without cords, tenderness or edema   Neurological:      General: No focal deficit present.      Mental Status: She is alert and oriented to person, place, and time.   Psychiatric:         Mood and Affect: Mood normal.         Behavior: Behavior normal.             Diagnoses and all orders for this visit:    1. Postpartum care following  delivery (Primary)    2. Vaginismus  -     Ambulatory Referral to Physical Therapy Pelvic Floor      Normal incision check. Plan f/u 4 weeks or prn. Reviewed ongoing activity restrictions.  Pt plans condoms for contraception.  She desires PT referral to plan therapy.          "

## 2024-02-06 NOTE — OUTREACH NOTE
Maternal Screening Survey      Flowsheet Row Responses   Facility patient discharged from? [place]   Attempt successful? Yes   Call start time 1439   Revoke Other, Decline to participate  [At OB office right now, encouraged pt to discuss with OB any s/s anxiety or depression]   Call end time 1442   Maternal Screening call completed Yes   Call end time 1442              ANTIONETTE HAJI - Registered Nurse

## 2024-02-14 ENCOUNTER — TELEPHONE (OUTPATIENT)
Dept: OBSTETRICS AND GYNECOLOGY | Age: 29
End: 2024-02-14
Payer: COMMERCIAL

## 2024-02-14 PROBLEM — Z86.32 HISTORY OF GESTATIONAL DIABETES: Status: ACTIVE | Noted: 2023-12-06

## 2024-02-14 PROBLEM — Z98.891 PREVIOUS CESAREAN SECTION: Status: ACTIVE | Noted: 2024-02-14

## 2024-02-16 ENCOUNTER — POSTPARTUM VISIT (OUTPATIENT)
Dept: OBSTETRICS AND GYNECOLOGY | Age: 29
End: 2024-02-16
Payer: COMMERCIAL

## 2024-02-16 ENCOUNTER — LAB (OUTPATIENT)
Facility: HOSPITAL | Age: 29
End: 2024-02-16
Payer: COMMERCIAL

## 2024-02-16 ENCOUNTER — HOSPITAL ENCOUNTER (OUTPATIENT)
Dept: CT IMAGING | Facility: HOSPITAL | Age: 29
Discharge: HOME | End: 2024-02-16
Payer: COMMERCIAL

## 2024-02-16 VITALS
DIASTOLIC BLOOD PRESSURE: 66 MMHG | SYSTOLIC BLOOD PRESSURE: 102 MMHG | BODY MASS INDEX: 22.43 KG/M2 | WEIGHT: 126.6 LBS | HEIGHT: 63 IN

## 2024-02-16 DIAGNOSIS — R07.9 CHEST PAIN, UNSPECIFIED TYPE: ICD-10-CM

## 2024-02-16 DIAGNOSIS — Z98.891 S/P CESAREAN SECTION: ICD-10-CM

## 2024-02-16 DIAGNOSIS — R07.9 CHEST PAIN, UNSPECIFIED TYPE: Primary | ICD-10-CM

## 2024-02-16 LAB
HOLD SPECIMEN: NORMAL
NT-PROBNP SERPL-MCNC: 93.4 PG/ML (ref 0–450)
WHOLE BLOOD HOLD COAG: NORMAL
WHOLE BLOOD HOLD SPECIMEN: NORMAL

## 2024-02-16 PROCEDURE — 83880 ASSAY OF NATRIURETIC PEPTIDE: CPT | Performed by: OBSTETRICS & GYNECOLOGY

## 2024-02-16 PROCEDURE — 25510000001 IOPAMIDOL PER 1 ML: Performed by: OBSTETRICS & GYNECOLOGY

## 2024-02-16 PROCEDURE — 36415 COLL VENOUS BLD VENIPUNCTURE: CPT | Performed by: OBSTETRICS & GYNECOLOGY

## 2024-02-16 PROCEDURE — 71275 CT ANGIOGRAPHY CHEST: CPT

## 2024-02-16 RX ADMIN — IOPAMIDOL 95 ML: 755 INJECTION, SOLUTION INTRAVENOUS at 15:06

## 2024-02-16 NOTE — NURSING NOTE
1509 Patient arrived to triage bay 8 with her  and child.  She is a stat hold and call CTA PE protocol.  1520 Dr. Velasco phoned here and states normal exam.  1521 I phoned Dr. Willis to report the normal exam findings.  She then spoke to the patient on the telephone.  1522 IV removed,  is feeding the baby her bottle.  They will leave after that. No distress noted.

## 2024-03-08 ENCOUNTER — POSTPARTUM VISIT (OUTPATIENT)
Dept: OBSTETRICS AND GYNECOLOGY | Age: 29
End: 2024-03-08
Payer: COMMERCIAL

## 2024-03-08 VITALS
BODY MASS INDEX: 22.64 KG/M2 | WEIGHT: 127.8 LBS | DIASTOLIC BLOOD PRESSURE: 74 MMHG | HEIGHT: 63 IN | SYSTOLIC BLOOD PRESSURE: 112 MMHG

## 2024-03-08 DIAGNOSIS — N94.2 VAGINISMUS: ICD-10-CM

## 2024-03-08 PROBLEM — Z87.59 HISTORY OF PLACENTAL ABNORMALITY: Status: ACTIVE | Noted: 2024-03-08

## 2024-03-08 NOTE — PROGRESS NOTES
"Subjective   April D Amol is a 28 y.o. female who presents for a postpartum visit. She is 6 weeks postpartum following a low cervical transverse  section. I have fully reviewed the prenatal and intrapartum course. The delivery was at 35 gestational weeks. Outcome: primary  section, low transverse incision. Anesthesia: spinal. Postpartum course has been complicated by initial chest discomfort. Pt had negative CT of chest. She reports her pain then resolved. . Baby's course has been uncomplicated. Baby is feeding by breast. Bleeding no bleeding. Bowel function is normal. Bladder function is normal. Patient is not sexually active. Contraception method is condoms. Postpartum depression screening: negative. She notes she did not get APNO but called lactation and they gave information for her to make ointment at home. She has applied twice and notes resolution of nipple issues.     The following portions of the patient's history were reviewed and updated as appropriate: allergies, current medications, past family history, past medical history, past social history, past surgical history, and problem list.    Review of Systems  Pertinent items are noted in HPI.    Objective   /74   Ht 160 cm (63\")   Wt 58 kg (127 lb 12.8 oz)   LMP 2023 (Exact Date)   Breastfeeding Yes   BMI 22.64 kg/m²    General:  alert, appears stated age, cooperative, and no distress    Breasts:  Def, breastfeeding   Lungs: clear to auscultation bilaterally   Heart:  regular rate and rhythm   Abdomen: Soft, nontender, incision is healed    Vulva:  normal   Vagina: normal vagina, no discharge, exudate, lesion, or erythema; exam limited by pt discomfort   Cervix:  no lesions   Corpus: normal size, contour, position, consistency, mobility, non-tender   Adnexa:  no mass, fullness, tenderness   Rectal Exam: Not performed.     Assessment & Plan   Normal postpartum exam. Pap smear done at today's visit.    1. Contraception: " condoms  2. Vaginismus: pt notes PT has reached out and she plans to schedule  3. Reviewed abnormal placental path showing vascular malperfusion. Recommend consult with MFM prior to next pregnancy and pt notes agreement.   3. Follow up in: 1  year  or as needed.

## 2024-03-12 LAB
CONV .: NORMAL
CYTOLOGIST CVX/VAG CYTO: NORMAL
CYTOLOGY CVX/VAG DOC CYTO: NORMAL
CYTOLOGY CVX/VAG DOC THIN PREP: NORMAL
DX ICD CODE: NORMAL
Lab: NORMAL
OTHER STN SPEC: NORMAL
STAT OF ADQ CVX/VAG CYTO-IMP: NORMAL

## 2024-03-13 ENCOUNTER — TELEPHONE (OUTPATIENT)
Dept: OBSTETRICS AND GYNECOLOGY | Age: 29
End: 2024-03-13
Payer: COMMERCIAL

## 2024-03-13 DIAGNOSIS — Z86.32 HISTORY OF GESTATIONAL DIABETES: Primary | ICD-10-CM

## 2024-03-13 NOTE — TELEPHONE ENCOUNTER
----- Message from Lorri Carmichael sent at 3/13/2024  4:15 PM EDT -----  Regarding: Follow Up PP  Contact: [phone]  Hi Dr. Thomas!     I have just gained access back to [employer] so sorry for the delay. I realized I forgot to ask at my 6 wk pp appointment, am I clear for exercise? More specifically, running?     Also, when should I get my A1C double checked with my primary following my gestational diabetes? I've heard it can take a while for numbers to go back to normal so I want to wait the appropriate amount of time.

## 2024-03-13 NOTE — TELEPHONE ENCOUNTER
Called April and reviewed her questions. Recommended postpartum glucose testing with fasting/2 hour. If passes, recommend annual hgbA1c. Pt notes understanding. Please schedule April for fasting and 2 hour glucose testing with lab.

## 2025-05-01 ENCOUNTER — OFFICE VISIT (OUTPATIENT)
Dept: OBSTETRICS AND GYNECOLOGY | Age: 30
End: 2025-05-01
Payer: COMMERCIAL

## 2025-05-01 VITALS
WEIGHT: 135.6 LBS | DIASTOLIC BLOOD PRESSURE: 62 MMHG | HEIGHT: 63 IN | SYSTOLIC BLOOD PRESSURE: 108 MMHG | BODY MASS INDEX: 24.03 KG/M2

## 2025-05-01 DIAGNOSIS — Z01.419 ENCOUNTER FOR GYNECOLOGICAL EXAMINATION: Primary | ICD-10-CM

## 2025-05-01 NOTE — PROGRESS NOTES
Subjective       History of Present Illness    Chief Complaint   Patient presents with    Gynecologic Exam     Ae last pap (-) 3/8/24 cc: no complaints today         Amol is a 29 y.o. female who presents for annual exam.  Patient of   15 mo old daughter Leeann is doing great, still working on learning to walk  Menses are irregular, this is normal for her  They use condoms for BC and are ok to continue that  No bowel or bladder problems  No dyspareunia  Patient is very anxious for pelvic exams, hx of vaginismus symptoms         OB History    Para Term  AB Living   1 1  1  1   SAB IAB Ectopic Molar Multiple Live Births       0 1      # Outcome Date GA Lbr Nir/2nd Weight Sex Type Anes PTL Lv   1  24 35w4d  2610 g (5 lb 12.1 oz) F CS-LTranv Spinal N CIERRA      Birth Comments: Panda OR 3       The following portions of the patient's history were reviewed and updated as appropriate: allergies, current medications, past family history, past medical history, past social history, past surgical history and problem list.    Her menses are irregular, lasting 4-7 days.    Current contraception: condoms  History of abnormal Pap smear: no  Family history of uterine or ovarian cancer: no  Family history of breast cancer: no    Mammogram: not indicated.  Colonoscopy: not indicated.  DEXA: not indicated.    Social History    Tobacco Use      Smoking status: Never      Smokeless tobacco: Not on file    Past Medical History:   Diagnosis Date    Anxiety       Past Surgical History:   Procedure Laterality Date     SECTION N/A 2024    Procedure:  SECTION PRIMARY;  Surgeon: Gab Guerrero MD;  Location: [facility] DELIVERY;  Service: Obstetrics/Gynecology;  Laterality: N/A;    TONSILLECTOMY          The following portions of the patient's history were reviewed and updated as appropriate: allergies, current medications, past family history, past medical history, past social  "history, past surgical history, and problem list.    Review of Systems   Constitutional: Negative.    HENT: Negative.     Eyes: Negative.    Respiratory: Negative.     Cardiovascular: Negative.    Gastrointestinal: Negative.    Endocrine: Negative.    Genitourinary: Negative.    Musculoskeletal: Negative.    Skin: Negative.    Allergic/Immunologic: Negative.    Neurological: Negative.    Hematological: Negative.    Psychiatric/Behavioral: Negative.           Objective   Physical Exam  Vitals reviewed.   Constitutional:       Appearance: She is well-developed.   Neck:      Thyroid: No thyroid mass.   Cardiovascular:      Rate and Rhythm: Normal rate and regular rhythm.      Heart sounds: Normal heart sounds.   Pulmonary:      Effort: Pulmonary effort is normal.      Breath sounds: Normal breath sounds.   Chest:   Breasts:     Right: No mass, nipple discharge, skin change or tenderness.      Left: No mass, nipple discharge, skin change or tenderness.   Abdominal:      Palpations: Abdomen is soft.      Tenderness: There is no abdominal tenderness.   Genitourinary:     Labia:         Right: No rash or lesion.         Left: No rash or lesion.       Vagina: Normal.      Cervix: No cervical motion tenderness, discharge or friability.      Adnexa:         Right: No mass or tenderness.          Left: No mass or tenderness.     Neurological:      Mental Status: She is alert and oriented to person, place, and time.   Psychiatric:         Behavior: Behavior normal.         /62   Ht 160 cm (62.99\")   Wt 61.5 kg (135 lb 9.6 oz)   LMP 04/06/2025 (Approximate)   Breastfeeding No   BMI 24.03 kg/m²     Assessment & Plan   Diagnoses and all orders for this visit:    1. Encounter for gynecological examination (Primary)  -     IGP,rfx Aptima HPV All Pth          Breast self exam technique reviewed and patient encouraged to perform self-exam monthly.  Discussed healthy lifestyle modifications.  Pap smear done with reflex " HPV  Recommended 30 minutes of aerobic exercise five times per week.  Discussed calcium needs to prevent osteoporosis

## 2025-05-05 LAB
CONV .: NORMAL
CYTOLOGIST CVX/VAG CYTO: NORMAL
CYTOLOGY CVX/VAG DOC CYTO: NORMAL
CYTOLOGY CVX/VAG DOC THIN PREP: NORMAL
DX ICD CODE: NORMAL
OTHER STN SPEC: NORMAL
SERVICE CMNT-IMP: NORMAL
STAT OF ADQ CVX/VAG CYTO-IMP: NORMAL

## (undated) DEVICE — ANTIBACTERIAL UNDYED BRAIDED (POLYGLACTIN 910), SYNTHETIC ABSORBABLE SUTURE: Brand: COATED VICRYL

## (undated) DEVICE — SUT MNCRYL PLS ANTIB UD 4/0 PS2 18IN

## (undated) DEVICE — GLV SURG BIOGEL LTX PF 6 1/2

## (undated) DEVICE — 3M(TM) TEGADERM(TM) TRANSPARENT FILM DRESSING FRAME STYLE 1627: Brand: 3M™ TEGADERM™

## (undated) DEVICE — SOL IRR H2O BTL 1000ML STRL

## (undated) DEVICE — NDL BLNT 18G 1 1/2IN

## (undated) DEVICE — Device: Brand: PORTEX